# Patient Record
Sex: FEMALE | Race: WHITE
[De-identification: names, ages, dates, MRNs, and addresses within clinical notes are randomized per-mention and may not be internally consistent; named-entity substitution may affect disease eponyms.]

---

## 2017-12-18 NOTE — ED
GI Bleed HPI





- General


Chief complaint: GI Bleed


Stated complaint: rectal bleeding/pain


Time Seen by Provider: 12/18/17 14:31


Source: patient, RN notes reviewed


Mode of arrival: ambulatory


Limitations: no limitations





- History of Present Illness


Initial comments: 





This is a 27-year-old female presents emergency Department chief complaint of 

rectal pain.  Patient states that she's had increasing rectal pain last few 

days.  She states this started after having multiple bowel movements of 

diarrhea.  Patient is concerned that she's had prior rectal abscess with for 

prior surgeries.  She states it's was several years ago.  Patient denies any 

nausea or vomiting.  She states that she has a burning type discomfort and 

noticed some blood and mucus.  Patient denies any upper abdominal discomfort 

denies any dysuria hematuria.  Denies any chance pregnancy.





- Related Data


 Home Medications











 Medication  Instructions  Recorded  Confirmed


 


Aubra 1 tab PO DAILY@1900 12/18/17 12/18/17


 


Citalopram Hydrobromide [CeleXA] 10 mg PO DAILY@1900 12/18/17 12/18/17








 Previous Rx's











 Medication  Instructions  Recorded


 


Hydrocortisone/Pramoxine 1 applic RECTAL BID #1 bottle 12/18/17





[Proctofoam-Hc 1%-1% Foam]  











 Allergies











Allergy/AdvReac Type Severity Reaction Status Date / Time


 


No Known Allergies Allergy   Verified 12/18/17 14:32














Review of Systems


ROS Statement: 


Those systems with pertinent positive or pertinent negative responses have been 

documented in the HPI.





ROS Other: All systems not noted in ROS Statement are negative.





Past Medical History


Past Medical History: No Reported History


History of Any Multi-Drug Resistant Organisms: None Reported


Past Surgical History: No Surgical Hx Reported


Additional Past Surgical History / Comment(s): I&D of cyst


Past Anesthesia/Blood Transfusion Reactions: No Reported Reaction


Past Psychological History: No Psychological Hx Reported


Smoking Status: Former smoker


Past Alcohol Use History: None Reported


Past Drug Use History: Marijuana





General Exam


Limitations: no limitations


General appearance: alert, in no apparent distress


Head exam: Present: atraumatic, normocephalic, normal inspection


Neck exam: Present: normal inspection.  Absent: tenderness, meningismus, 

lymphadenopathy


Respiratory exam: Present: normal lung sounds bilaterally.  Absent: respiratory 

distress, wheezes, rales, rhonchi, stridor


Cardiovascular Exam: Present: regular rate, normal rhythm, normal heart sounds.

  Absent: systolic murmur, diastolic murmur, rubs, gallop, clicks


GI/Abdominal exam: Present: soft, normal bowel sounds.  Absent: distended, 

tenderness, guarding, rebound, rigid


Rectal exam: Absent: normal inspection (Erythema and rash noted in the perianal 

region, no obvious hemorrhoids no obvious blood or open lacerations.  Patient 

declined rectal)


Back exam: Absent: CVA tenderness (R), CVA tenderness (L)


Skin exam: Present: warm, dry, intact, normal color.  Absent: rash





Course


 Vital Signs











  12/18/17 12/18/17





  13:53 15:08


 


Temperature 98.8 F 98.2 F


 


Pulse Rate 110 H 79


 


Respiratory 18 16





Rate  


 


Blood Pressure 156/72 115/70


 


O2 Sat by Pulse 96 96





Oximetry  














Medical Decision Making





- Medical Decision Making





27-year-old female presents emergency Department chief complaint rectal pain, 

rectal bleeding.  Patient had a history of rectal abscesses no evidence of 

infection fracture process on CT.  Patient be given hydrocortisone cream, 

advised take stool softener as she has stool burden, continue to use Tucks pads 

and continues over-the-counter rectal care lidocaine jelly





- Lab Data


Result diagrams: 


 12/18/17 14:55





 12/18/17 14:55


 Lab Results











  12/18/17 12/18/17 12/18/17 Range/Units





  14:55 14:55 14:55 


 


WBC  10.2    (3.8-10.6)  k/uL


 


RBC  5.04    (3.80-5.40)  m/uL


 


Hgb  14.7    (11.4-16.0)  gm/dL


 


Hct  44.2    (34.0-46.0)  %


 


MCV  87.8    (80.0-100.0)  fL


 


MCH  29.1    (25.0-35.0)  pg


 


MCHC  33.2    (31.0-37.0)  g/dL


 


RDW  14.1    (11.5-15.5)  %


 


Plt Count  240    (150-450)  k/uL


 


Neutrophils %  77    %


 


Lymphocytes %  14    %


 


Monocytes %  7    %


 


Eosinophils %  2    %


 


Basophils %  0    %


 


Neutrophils #  7.8 H    (1.3-7.7)  k/uL


 


Lymphocytes #  1.4    (1.0-4.8)  k/uL


 


Monocytes #  0.7    (0-1.0)  k/uL


 


Eosinophils #  0.2    (0-0.7)  k/uL


 


Basophils #  0.0    (0-0.2)  k/uL


 


PT    10.1  (9.0-12.0)  sec


 


INR    1.0  (<1.2)  


 


APTT    25.7  (22.0-30.0)  sec


 


Sodium   140   (137-145)  mmol/L


 


Potassium   4.1   (3.5-5.1)  mmol/L


 


Chloride   104   ()  mmol/L


 


Carbon Dioxide   25   (22-30)  mmol/L


 


Anion Gap   11   mmol/L


 


BUN   13   (7-17)  mg/dL


 


Creatinine   0.71   (0.52-1.04)  mg/dL


 


Est GFR (MDRD) Af Amer   >60   (>60 ml/min/1.73 sqM)  


 


Est GFR (MDRD) Non-Af   >60   (>60 ml/min/1.73 sqM)  


 


Glucose   101 H   (74-99)  mg/dL


 


Calcium   9.6   (8.4-10.2)  mg/dL


 


Total Bilirubin   0.5   (0.2-1.3)  mg/dL


 


AST   24   (14-36)  U/L


 


ALT   37   (9-52)  U/L


 


Alkaline Phosphatase   67   ()  U/L


 


Total Protein   7.9   (6.3-8.2)  g/dL


 


Albumin   4.2   (3.5-5.0)  g/dL


 


Urine Color     


 


Urine Appearance     (Clear)  


 


Urine pH     (5.0-8.0)  


 


Ur Specific Gravity     (1.001-1.035)  


 


Urine Protein     (Negative)  


 


Urine Glucose (UA)     (Negative)  


 


Urine Ketones     (Negative)  


 


Urine Blood     (Negative)  


 


Urine Nitrite     (Negative)  


 


Urine Bilirubin     (Negative)  


 


Urine Urobilinogen     (<2.0)  mg/dL


 


Ur Leukocyte Esterase     (Negative)  


 


Urine RBC     (0-5)  /hpf


 


Urine WBC     (0-5)  /hpf


 


Ur Squamous Epith Cells     (0-4)  /hpf


 


Urine HCG, Qual     (Not Detectd)  














  12/18/17 12/18/17 Range/Units





  14:55 14:55 


 


WBC    (3.8-10.6)  k/uL


 


RBC    (3.80-5.40)  m/uL


 


Hgb    (11.4-16.0)  gm/dL


 


Hct    (34.0-46.0)  %


 


MCV    (80.0-100.0)  fL


 


MCH    (25.0-35.0)  pg


 


MCHC    (31.0-37.0)  g/dL


 


RDW    (11.5-15.5)  %


 


Plt Count    (150-450)  k/uL


 


Neutrophils %    %


 


Lymphocytes %    %


 


Monocytes %    %


 


Eosinophils %    %


 


Basophils %    %


 


Neutrophils #    (1.3-7.7)  k/uL


 


Lymphocytes #    (1.0-4.8)  k/uL


 


Monocytes #    (0-1.0)  k/uL


 


Eosinophils #    (0-0.7)  k/uL


 


Basophils #    (0-0.2)  k/uL


 


PT    (9.0-12.0)  sec


 


INR    (<1.2)  


 


APTT    (22.0-30.0)  sec


 


Sodium    (137-145)  mmol/L


 


Potassium    (3.5-5.1)  mmol/L


 


Chloride    ()  mmol/L


 


Carbon Dioxide    (22-30)  mmol/L


 


Anion Gap    mmol/L


 


BUN    (7-17)  mg/dL


 


Creatinine    (0.52-1.04)  mg/dL


 


Est GFR (MDRD) Af Amer    (>60 ml/min/1.73 sqM)  


 


Est GFR (MDRD) Non-Af    (>60 ml/min/1.73 sqM)  


 


Glucose    (74-99)  mg/dL


 


Calcium    (8.4-10.2)  mg/dL


 


Total Bilirubin    (0.2-1.3)  mg/dL


 


AST    (14-36)  U/L


 


ALT    (9-52)  U/L


 


Alkaline Phosphatase    ()  U/L


 


Total Protein    (6.3-8.2)  g/dL


 


Albumin    (3.5-5.0)  g/dL


 


Urine Color   Yellow  


 


Urine Appearance   Clear  (Clear)  


 


Urine pH   6.5  (5.0-8.0)  


 


Ur Specific Gravity   1.013  (1.001-1.035)  


 


Urine Protein   Negative  (Negative)  


 


Urine Glucose (UA)   Negative  (Negative)  


 


Urine Ketones   Negative  (Negative)  


 


Urine Blood   Trace H  (Negative)  


 


Urine Nitrite   Negative  (Negative)  


 


Urine Bilirubin   Negative  (Negative)  


 


Urine Urobilinogen   <2.0  (<2.0)  mg/dL


 


Ur Leukocyte Esterase   Large H  (Negative)  


 


Urine RBC   1  (0-5)  /hpf


 


Urine WBC   7 H  (0-5)  /hpf


 


Ur Squamous Epith Cells   3  (0-4)  /hpf


 


Urine HCG, Qual  Not Detected   (Not Detectd)  














Disposition


Clinical Impression: 


 Rectal pain, Hemorrhoids





Disposition: HOME SELF-CARE


Condition: Stable


Instructions:  Rectal Pain (ED)


Additional Instructions: 


Please return to the Emergency Department if symptoms worsen or any other 

concerns.


Prescriptions: 


Hydrocortisone/Pramoxine [Proctofoam-Hc 1%-1% Foam] 1 applic RECTAL BID #1 

bottle


Referrals: 


Vi Rahman MD [Primary Care Provider] - 1-2 days


Time of Disposition: 16:15

## 2017-12-18 NOTE — CT
EXAMINATION TYPE: CT abdomen pelvis w con

 

DATE OF EXAM: 12/18/2017

 

COMPARISON: 11/5/2010

 

HISTORY: 27-year-old female with rectal bleeding and pain

 

TECHNIQUE: Contiguous axial scanning of the abdomen and pelvis following administration of 100 ml Omn
ipaque 300 IV contrast.  Delayed images through the kidneys and coronal/sagittal reconstructions perf
ormed.

 

CT DLP: 1854 mGycm

Automated exposure control for dose reduction was used.

 

 

FINDINGS:

Heart is normal size without pericardial effusion. Lung bases clear without pleural effusion.

 

Liver enlarged measuring 18.8 cm craniocaudal. No focal lesion is seen. No biliary ductal dilatation.
 Portal venous system is patent

 

Gallbladder, adrenal glands, kidneys, spleen with anterior splenule, and pancreas appear within amado
l limits.

 

Scattered nonenlarged mesenteric lymph nodes are present.

 

No dilated small bowel, free fluid, or free air.

 

Normal appendix visualized.

 

Moderate stool burden. No pericolonic inflammatory change. Mildly redundant sigmoid colon.

 

Bladder is urine distended. Uterus and ovaries are visualized with follicular change. No abnormal flu
id collection in the pelvis.

 

Prominent but not enlarged right inguinal lymph node measures up to 1.3 cm, increased in size from 11
/5/2010.

 

The distal rectum and anus are relatively collapsed and show no gross abnormality.

 

Bones: No osseous destructive process.

 

 

IMPRESSION: 

 

1. THE DISTAL RECTUM AND ANUS ARE COLLAPSED AND SHOW NO GROSS ABNORMALITY.

2. MODERATE STOOL BURDEN. NO ACUTE INFLAMMATORY PROCESS IDENTIFIED IN THE ABDOMEN OR PELVIS.

3. BORDERLINE SIZE RIGHT INGUINAL LYMPH NODE MEASURING 1.3 CM PROBABLY REACTIVE/POST INFLAMMATORY. CL
INICALLY CORRELATE.

4. HEPATOMEGALY AT 18.8 CM.

## 2018-08-21 NOTE — ED
Weakness HPI





- General


Chief complaint: Weakness


Stated complaint: weakness


Time Seen by Provider: 18 13:31


Source: patient, RN notes reviewed


Mode of arrival: ambulatory


Limitations: no limitations





- History of Present Illness


Initial comments: 





28-year-old female presents emergency Department chief complaint of fatigue, 

denies fever or chills.  Patient states that she felt that she had a fever, she 

felt hot flush but had no recorded temperature at home.  Patient states that she

's had no cold like symptoms including sore throat, headache, dizziness, ear 

pain, cough or chest congestion.  She states that she's been sleeping more than 

usual even when she gets up and sleeping she still tired.  She is concerned 

about possible thyroid disease.  Patient has no specific abdominal pain no 

dysuria no hematuria.  She states that she is due for her menstrual cycle 

denies any chance pregnancy.  Patient states that she does take birth control.  

Patient denies any back pain, chest pain





- Related Data


 Home Medications











 Medication  Instructions  Recorded  Confirmed


 


Citalopram Hydrobromide [CeleXA] 10 mg PO DAILY@1900 17


 


Aviane 0.10/0.02 Birth Control 1 tab PO DAILY@1900 18


 


Ibuprofen [Motrin Ib] 400 mg PO Q6H PRN 18











 Allergies











Allergy/AdvReac Type Severity Reaction Status Date / Time


 


No Known Allergies Allergy   Verified 18 13:52














Review of Systems


ROS Statement: 


Those systems with pertinent positive or pertinent negative responses have been 

documented in the HPI.





ROS Other: All systems not noted in ROS Statement are negative.





Past Medical History


Past Medical History: No Reported History


History of Any Multi-Drug Resistant Organisms: None Reported


Past Surgical History:  Section


Additional Past Surgical History / Comment(s): I&D of cyst


Past Anesthesia/Blood Transfusion Reactions: No Reported Reaction


Past Psychological History: Anxiety


Smoking Status: Current every day smoker


Past Alcohol Use History: Occasional


Past Drug Use History: Marijuana





General Exam


Limitations: no limitations


General appearance: alert, in no apparent distress


Head exam: Present: atraumatic, normocephalic, normal inspection


Eye exam: Present: normal appearance, PERRL, EOMI.  Absent: scleral icterus, 

conjunctival injection, periorbital swelling


ENT exam: Present: normal exam, normal oropharynx, mucous membranes moist, TM's 

normal bilaterally, normal external ear exam


Neck exam: Present: normal inspection, full ROM.  Absent: tenderness, 

meningismus, lymphadenopathy


Respiratory exam: Present: normal lung sounds bilaterally.  Absent: respiratory 

distress, wheezes, rales, rhonchi, stridor


Cardiovascular Exam: Present: regular rate, normal rhythm, normal heart sounds.

  Absent: systolic murmur, diastolic murmur, rubs, gallop, clicks


GI/Abdominal exam: Present: soft, normal bowel sounds.  Absent: distended, 

tenderness, guarding, rebound, rigid


Neurological exam: Present: alert, oriented X3, CN II-XII intact


Skin exam: Present: warm, dry, intact, normal color.  Absent: rash





Course


 Vital Signs











  18





  12:39


 


Temperature 98.2 F


 


Pulse Rate 93


 


Respiratory 18





Rate 


 


Blood Pressure 113/77


 


O2 Sat by Pulse 100





Oximetry 














Medical Decision Making





- Medical Decision Making





28 show female presented for generalized fatigue weakness.  Patient has normal 

lab workup.  Patient may have underlying viral illness or syndrome.  Patient 

will be advised to follow-up with PCP and return for any worsening symptoms.





- Lab Data


Result diagrams: 


 18 13:49





 18 13:49


 Lab Results











  18 Range/Units





  12:43 12:43 13:49 


 


WBC    7.5  (3.8-10.6)  k/uL


 


RBC    5.02  (3.80-5.40)  m/uL


 


Hgb    14.4  (11.4-16.0)  gm/dL


 


Hct    44.6  (34.0-46.0)  %


 


MCV    89.0  (80.0-100.0)  fL


 


MCH    28.7  (25.0-35.0)  pg


 


MCHC    32.2  (31.0-37.0)  g/dL


 


RDW    13.3  (11.5-15.5)  %


 


Plt Count    278  (150-450)  k/uL


 


Neutrophils %    63  %


 


Lymphocytes %    26  %


 


Monocytes %    6  %


 


Eosinophils %    3  %


 


Basophils %    0  %


 


Neutrophils #    4.7  (1.3-7.7)  k/uL


 


Lymphocytes #    2.0  (1.0-4.8)  k/uL


 


Monocytes #    0.5  (0-1.0)  k/uL


 


Eosinophils #    0.2  (0-0.7)  k/uL


 


Basophils #    0.0  (0-0.2)  k/uL


 


Sodium     (137-145)  mmol/L


 


Potassium     (3.5-5.1)  mmol/L


 


Chloride     ()  mmol/L


 


Carbon Dioxide     (22-30)  mmol/L


 


Anion Gap     mmol/L


 


BUN     (7-17)  mg/dL


 


Creatinine     (0.52-1.04)  mg/dL


 


Est GFR (CKD-EPI)AfAm     (>60 ml/min/1.73 sqM)  


 


Est GFR (CKD-EPI)NonAf     (>60 ml/min/1.73 sqM)  


 


Glucose     (74-99)  mg/dL


 


Plasma Lactic Acid Abdifatah     (0.7-2.0)  mmol/L


 


Calcium     (8.4-10.2)  mg/dL


 


Magnesium     (1.6-2.3)  mg/dL


 


Total Bilirubin     (0.2-1.3)  mg/dL


 


AST     (14-36)  U/L


 


ALT     (9-52)  U/L


 


Alkaline Phosphatase     ()  U/L


 


Total Protein     (6.3-8.2)  g/dL


 


Albumin     (3.5-5.0)  g/dL


 


TSH     (0.465-4.680)  mIU/L


 


Urine Color   Yellow   


 


Urine Appearance   Clear   (Clear)  


 


Urine pH   6.5   (5.0-8.0)  


 


Ur Specific Gravity   1.013   (1.001-1.035)  


 


Urine Protein   Negative   (Negative)  


 


Urine Glucose (UA)   Negative   (Negative)  


 


Urine Ketones   Negative   (Negative)  


 


Urine Blood   Negative   (Negative)  


 


Urine Nitrite   Negative   (Negative)  


 


Urine Bilirubin   Negative   (Negative)  


 


Urine Urobilinogen   <2.0   (<2.0)  mg/dL


 


Ur Leukocyte Esterase   Trace H   (Negative)  


 


Urine RBC   1   (0-5)  /hpf


 


Urine WBC   1   (0-5)  /hpf


 


Ur Squamous Epith Cells   2   (0-4)  /hpf


 


Urine Bacteria   Rare H   (None)  /hpf


 


Urine HCG, Qual  Not Detected    (Not Detectd)  


 


Heterophile Antibody     (Negative)  














  18 Range/Units





  13:49 13:49 14:00 


 


WBC     (3.8-10.6)  k/uL


 


RBC     (3.80-5.40)  m/uL


 


Hgb     (11.4-16.0)  gm/dL


 


Hct     (34.0-46.0)  %


 


MCV     (80.0-100.0)  fL


 


MCH     (25.0-35.0)  pg


 


MCHC     (31.0-37.0)  g/dL


 


RDW     (11.5-15.5)  %


 


Plt Count     (150-450)  k/uL


 


Neutrophils %     %


 


Lymphocytes %     %


 


Monocytes %     %


 


Eosinophils %     %


 


Basophils %     %


 


Neutrophils #     (1.3-7.7)  k/uL


 


Lymphocytes #     (1.0-4.8)  k/uL


 


Monocytes #     (0-1.0)  k/uL


 


Eosinophils #     (0-0.7)  k/uL


 


Basophils #     (0-0.2)  k/uL


 


Sodium  140    (137-145)  mmol/L


 


Potassium  4.5    (3.5-5.1)  mmol/L


 


Chloride  109 H    ()  mmol/L


 


Carbon Dioxide  23    (22-30)  mmol/L


 


Anion Gap  8    mmol/L


 


BUN  12    (7-17)  mg/dL


 


Creatinine  0.69    (0.52-1.04)  mg/dL


 


Est GFR (CKD-EPI)AfAm  >90    (>60 ml/min/1.73 sqM)  


 


Est GFR (CKD-EPI)NonAf  >90    (>60 ml/min/1.73 sqM)  


 


Glucose  106 H    (74-99)  mg/dL


 


Plasma Lactic Acid Abdifatah    1.0  (0.7-2.0)  mmol/L


 


Calcium  9.1    (8.4-10.2)  mg/dL


 


Magnesium  1.9    (1.6-2.3)  mg/dL


 


Total Bilirubin  0.5    (0.2-1.3)  mg/dL


 


AST  30    (14-36)  U/L


 


ALT  35    (9-52)  U/L


 


Alkaline Phosphatase  49    ()  U/L


 


Total Protein  7.4    (6.3-8.2)  g/dL


 


Albumin  4.0    (3.5-5.0)  g/dL


 


TSH  0.472    (0.465-4.680)  mIU/L


 


Urine Color     


 


Urine Appearance     (Clear)  


 


Urine pH     (5.0-8.0)  


 


Ur Specific Gravity     (1.001-1.035)  


 


Urine Protein     (Negative)  


 


Urine Glucose (UA)     (Negative)  


 


Urine Ketones     (Negative)  


 


Urine Blood     (Negative)  


 


Urine Nitrite     (Negative)  


 


Urine Bilirubin     (Negative)  


 


Urine Urobilinogen     (<2.0)  mg/dL


 


Ur Leukocyte Esterase     (Negative)  


 


Urine RBC     (0-5)  /hpf


 


Urine WBC     (0-5)  /hpf


 


Ur Squamous Epith Cells     (0-4)  /hpf


 


Urine Bacteria     (None)  /hpf


 


Urine HCG, Qual     (Not Detectd)  


 


Heterophile Antibody   Negative   (Negative)  














Disposition


Clinical Impression: 


 Fatigue, Viral syndrome





Disposition: HOME SELF-CARE


Condition: Stable


Instructions:  Fatigue (ED), Weakness (ED)


Additional Instructions: 


Please return to the Emergency Department if symptoms worsen or any other 

concerns.


Is patient prescribed a controlled substance at d/c from ED?: No


Referrals: 


Vi Rahman MD [Primary Care Provider] - 1-2 days


Time of Disposition: 14:58

## 2018-11-18 NOTE — XR
EXAMINATION TYPE: XR chest 2V

 

DATE OF EXAM: 11/18/2018

 

CLINICAL HISTORY: Pain upper respiratory infection 

 

TECHNIQUE:  Frontal and lateral views of the chest are obtained.

 

COMPARISON: None

 

FINDINGS: The heart size is normal. The pulmonary vasculature is normal. The lungs are clear.

Necklace overlies left apex.

 

IMPRESSION:

1. No acute pulmonary process.

## 2018-11-18 NOTE — ED
General Adult HPI





- General


Chief complaint: Upper Respiratory Infection


Stated complaint: Abd pain


Time Seen by Provider: 18 16:16


Source: patient


Mode of arrival: ambulatory


Limitations: no limitations





- History of Present Illness


Initial comments: 


Residual female presenting with 1 week of harsh cough, subjective fevers, 

headache and generalized fatigue.  She states that today she began to develop 

sharp stabbing right lower quadrant pain that is constant, not alleviated or 

exacerbated by anything, not accompanied by any other symptoms.  She denies any 

nausea vomiting or diarrhea.  Cough is productive and is worsening.  She's been 

trying DayQuil without relief. 








- Related Data


 Home Medications











 Medication  Instructions  Recorded  Confirmed


 


Citalopram Hydrobromide [CeleXA] 10 mg PO DAILY@17


 


Aviane 0.10/0.02 Birth Control 1 tab PO DAILY@18


 


Ibuprofen [Motrin Ib] 400 mg PO Q6H PRN 18








 Previous Rx's











 Medication  Instructions  Recorded


 


Albuterol Inhaler [Ventolin Hfa 1 - 2 puff INHALATION RT-Q6H PRN 18





Inhaler] #1 inhaler 


 


Dicyclomine [Bentyl] 20 mg PO QID #20 tablet 18


 


predniSONE 40 mg PO DAILY 5 Days #10 tab 18











 Allergies











Allergy/AdvReac Type Severity Reaction Status Date / Time


 


No Known Allergies Allergy   Verified 18 13:52














Review of Systems


ROS Statement: 


Those systems with pertinent positive or pertinent negative responses have been 

documented in the HPI.


Review of Systems


Constitutional: Denies fever, chills 


Eyes: Denies change in vision, Denies pain


Ears, nose, mouth, throat: Denies headaches, Denies sore throat


Cardiovascular: Denies chest pain. Denies palpitations 


Respiratory: Denies shortness of breath, Positive cough


Gastrointestinal: Positive abdominal pain. Denies nausea, vomiting, diarrhea. 


Genitourinary: Denies hematuria, Denies infections


Musculoskeletal: Denies pain, Denies swelling


Integumentary: Denies rash


Neurological: Positive headache, focal weakness, focal numbness


Psychiatric: Denies anxiety, Denies depression


Hematologic/Lymphatic: Denies easy bleeding or bruising 


ROS Other: All systems not noted in ROS Statement are negative.





Past Medical History


Past Medical History: No Reported History


History of Any Multi-Drug Resistant Organisms: None Reported


Past Surgical History:  Section


Additional Past Surgical History / Comment(s): I&D of cyst


Past Anesthesia/Blood Transfusion Reactions: No Reported Reaction


Past Psychological History: Anxiety


Smoking Status: Current every day smoker


Past Alcohol Use History: Occasional


Past Drug Use History: Marijuana





General Exam





- General Exam Comments


Initial Comments: 


General: Awake, alert, No acute Distress


HENT: Normocephalic. Atraumatic.  No erythematous TM bilaterally.  Post-

oropharyngeal erythema


Eyes: PERRL. EOMI. No scleral icterus. No injected conjunctiva


Neck: Full ROM


Chest/Lungs: Bilateral expiratory wheeze and right lower lung field rhonchi.


Cardiac: Regular rate, rhythm. No murmurs or rubs


Abdomen/GI: Soft, right lower quadrant tenderness.  Positive McBurney's point.  

nondistended. No rebound, guarding, or rigidity.


Musculoskeletal: Full ROM 


Skin: Warm, dry, intact


Neurologic: A/Ox3, no weakness, no sensory deficit, no abnormal gait, no 

coordination deficit





Limitations: no limitations





Course


 Vital Signs











  18





  16:05 17:03 17:10


 


Temperature 98.5 F  


 


Pulse Rate 64 66 70


 


Respiratory 18  





Rate   


 


Blood Pressure 121/74  


 


O2 Sat by Pulse 96  





Oximetry   














  18





  18:07 18:22 19:00


 


Temperature   


 


Pulse Rate 68  89


 


Respiratory 18  18





Rate   


 


Blood Pressure 123/73  112/68


 


O2 Sat by Pulse 98 93 L 97





Oximetry   














Medical Decision Making





- Medical Decision Making


28-year-old female presenting with URI symptoms and abdominal pain.  Initial 

exam the patient is awake, alert, no acute distress.  VSS.  Patient's 

laboratory workup revealed hyperkalemia however the result was immobilized and 

the patient had no kidney dysfunction.  Did not to treat the hyperkalemia.  The 

remainder the patient's laboratory workup was negative for acute process.  

Chest x-ray and CT abdomen and pelvis were also negative for acute process.  She

's a gallstone was seen however the patient has no right upper quadrant 

tenderness on exam.  The patient denied any vaginal bleeding or discharge.  

Patient initially was complaining of headache and neck pain this is been 

ongoing for a week.  I offered the patient LP but she declined.  Very low 

suspicion for bacterial meningitis at this time.  The patient was given 

prescriptions for prednisone and albuterol inhaler for bronchitis as well as 

Bentyl for abdominal cramping. The patient was instructed to RTER if her 

abdominal pain worsens, especially over the next 8-12 hours. No further 

emergent workup indicated. The patient was given return to ED instructions. 

They were instructed to follow up with their primary care provider. Stable for 

discharge at this time. 








- Lab Data


Result diagrams: 


 18 17:03





 18 17:03


 Lab Results











  18 Range/Units





  17:03 17:03 17:03 


 


WBC   8.4   (3.8-10.6)  k/uL


 


RBC   4.76   (3.80-5.40)  m/uL


 


Hgb   14.4   (11.4-16.0)  gm/dL


 


Hct   42.4   (34.0-46.0)  %


 


MCV   89.0   (80.0-100.0)  fL


 


MCH   30.3   (25.0-35.0)  pg


 


MCHC   34.1   (31.0-37.0)  g/dL


 


RDW   13.3   (11.5-15.5)  %


 


Plt Count   216   (150-450)  k/uL


 


Neutrophils %   63   %


 


Lymphocytes %   26   %


 


Monocytes %   6   %


 


Eosinophils %   4   %


 


Basophils %   0   %


 


Neutrophils #   5.3   (1.3-7.7)  k/uL


 


Lymphocytes #   2.2   (1.0-4.8)  k/uL


 


Monocytes #   0.5   (0-1.0)  k/uL


 


Eosinophils #   0.3   (0-0.7)  k/uL


 


Basophils #   0.0   (0-0.2)  k/uL


 


Sodium  141    (137-145)  mmol/L


 


Potassium  5.7 H    (3.5-5.1)  mmol/L


 


Chloride  110 H    ()  mmol/L


 


Carbon Dioxide  20 L    (22-30)  mmol/L


 


Anion Gap  11    mmol/L


 


BUN  10    (7-17)  mg/dL


 


Creatinine  0.75    (0.52-1.04)  mg/dL


 


Est GFR (CKD-EPI)AfAm  >90    (>60 ml/min/1.73 sqM)  


 


Est GFR (CKD-EPI)NonAf  >90    (>60 ml/min/1.73 sqM)  


 


Glucose  82    (74-99)  mg/dL


 


Calcium  9.7    (8.4-10.2)  mg/dL


 


Urine Color     


 


Urine Appearance     (Clear)  


 


Urine pH     (5.0-8.0)  


 


Ur Specific Gravity     (1.001-1.035)  


 


Urine Protein     (Negative)  


 


Urine Glucose (UA)     (Negative)  


 


Urine Ketones     (Negative)  


 


Urine Blood     (Negative)  


 


Urine Nitrite     (Negative)  


 


Urine Bilirubin     (Negative)  


 


Urine Urobilinogen     (<2.0)  mg/dL


 


Ur Leukocyte Esterase     (Negative)  


 


Urine WBC     (0-5)  /hpf


 


Ur Squamous Epith Cells     (0-4)  /hpf


 


Urine Bacteria     (None)  /hpf


 


Urine HCG, Qual    Not Detected  (Not Detectd)  


 


Heterophile Antibody     (Negative)  


 


Influenza Type A RNA     (Not Detectd)  


 


Influenza Type B (PCR)     (Not Detectd)  


 


Group A Strep Rapid     (Negative)  














  18 Range/Units





  17:03 17:03 17:03 


 


WBC     (3.8-10.6)  k/uL


 


RBC     (3.80-5.40)  m/uL


 


Hgb     (11.4-16.0)  gm/dL


 


Hct     (34.0-46.0)  %


 


MCV     (80.0-100.0)  fL


 


MCH     (25.0-35.0)  pg


 


MCHC     (31.0-37.0)  g/dL


 


RDW     (11.5-15.5)  %


 


Plt Count     (150-450)  k/uL


 


Neutrophils %     %


 


Lymphocytes %     %


 


Monocytes %     %


 


Eosinophils %     %


 


Basophils %     %


 


Neutrophils #     (1.3-7.7)  k/uL


 


Lymphocytes #     (1.0-4.8)  k/uL


 


Monocytes #     (0-1.0)  k/uL


 


Eosinophils #     (0-0.7)  k/uL


 


Basophils #     (0-0.2)  k/uL


 


Sodium     (137-145)  mmol/L


 


Potassium     (3.5-5.1)  mmol/L


 


Chloride     ()  mmol/L


 


Carbon Dioxide     (22-30)  mmol/L


 


Anion Gap     mmol/L


 


BUN     (7-17)  mg/dL


 


Creatinine     (0.52-1.04)  mg/dL


 


Est GFR (CKD-EPI)AfAm     (>60 ml/min/1.73 sqM)  


 


Est GFR (CKD-EPI)NonAf     (>60 ml/min/1.73 sqM)  


 


Glucose     (74-99)  mg/dL


 


Calcium     (8.4-10.2)  mg/dL


 


Urine Color    Light Yellow  


 


Urine Appearance    Cloudy H  (Clear)  


 


Urine pH    8.0  (5.0-8.0)  


 


Ur Specific Gravity    1.011  (1.001-1.035)  


 


Urine Protein    Negative  (Negative)  


 


Urine Glucose (UA)    Negative  (Negative)  


 


Urine Ketones    Negative  (Negative)  


 


Urine Blood    Negative  (Negative)  


 


Urine Nitrite    Negative  (Negative)  


 


Urine Bilirubin    Negative  (Negative)  


 


Urine Urobilinogen    <2.0  (<2.0)  mg/dL


 


Ur Leukocyte Esterase    Small H  (Negative)  


 


Urine WBC    6 H  (0-5)  /hpf


 


Ur Squamous Epith Cells    8 H  (0-4)  /hpf


 


Urine Bacteria    Rare H  (None)  /hpf


 


Urine HCG, Qual     (Not Detectd)  


 


Heterophile Antibody     (Negative)  


 


Influenza Type A RNA  Not Detected    (Not Detectd)  


 


Influenza Type B (PCR)  Not Detected    (Not Detectd)  


 


Group A Strep Rapid   Negative   (Negative)  














  18 Range/Units





  17:03 


 


WBC   (3.8-10.6)  k/uL


 


RBC   (3.80-5.40)  m/uL


 


Hgb   (11.4-16.0)  gm/dL


 


Hct   (34.0-46.0)  %


 


MCV   (80.0-100.0)  fL


 


MCH   (25.0-35.0)  pg


 


MCHC   (31.0-37.0)  g/dL


 


RDW   (11.5-15.5)  %


 


Plt Count   (150-450)  k/uL


 


Neutrophils %   %


 


Lymphocytes %   %


 


Monocytes %   %


 


Eosinophils %   %


 


Basophils %   %


 


Neutrophils #   (1.3-7.7)  k/uL


 


Lymphocytes #   (1.0-4.8)  k/uL


 


Monocytes #   (0-1.0)  k/uL


 


Eosinophils #   (0-0.7)  k/uL


 


Basophils #   (0-0.2)  k/uL


 


Sodium   (137-145)  mmol/L


 


Potassium   (3.5-5.1)  mmol/L


 


Chloride   ()  mmol/L


 


Carbon Dioxide   (22-30)  mmol/L


 


Anion Gap   mmol/L


 


BUN   (7-17)  mg/dL


 


Creatinine   (0.52-1.04)  mg/dL


 


Est GFR (CKD-EPI)AfAm   (>60 ml/min/1.73 sqM)  


 


Est GFR (CKD-EPI)NonAf   (>60 ml/min/1.73 sqM)  


 


Glucose   (74-99)  mg/dL


 


Calcium   (8.4-10.2)  mg/dL


 


Urine Color   


 


Urine Appearance   (Clear)  


 


Urine pH   (5.0-8.0)  


 


Ur Specific Gravity   (1.001-1.035)  


 


Urine Protein   (Negative)  


 


Urine Glucose (UA)   (Negative)  


 


Urine Ketones   (Negative)  


 


Urine Blood   (Negative)  


 


Urine Nitrite   (Negative)  


 


Urine Bilirubin   (Negative)  


 


Urine Urobilinogen   (<2.0)  mg/dL


 


Ur Leukocyte Esterase   (Negative)  


 


Urine WBC   (0-5)  /hpf


 


Ur Squamous Epith Cells   (0-4)  /hpf


 


Urine Bacteria   (None)  /hpf


 


Urine HCG, Qual   (Not Detectd)  


 


Heterophile Antibody  Negative  (Negative)  


 


Influenza Type A RNA   (Not Detectd)  


 


Influenza Type B (PCR)   (Not Detectd)  


 


Group A Strep Rapid   (Negative)  














Disposition


Clinical Impression: 


 Abdominal pain, Bronchitis, URI (upper respiratory infection)





Disposition: HOME SELF-CARE


Condition: Good


Instructions:  Upper Respiratory Infection (ED), Abdominal Pain (ED)


Prescriptions: 


Albuterol Inhaler [Ventolin Hfa Inhaler] 1 - 2 puff INHALATION RT-Q6H PRN #1 

inhaler


 PRN Reason: Wheezing


Dicyclomine [Bentyl] 20 mg PO QID #20 tablet


predniSONE 40 mg PO DAILY 5 Days #10 tab


Is patient prescribed a controlled substance at d/c from ED?: No


Referrals: 


Vi Rahman MD [Primary Care Provider] - 1-2 days

## 2018-11-18 NOTE — CT
EXAMINATION TYPE: CT abdomen pelvis w con

 

DATE OF EXAM: 11/18/2018

 

COMPARISON: 12/18/2017

 

INDICATION: Right sided pain with nausea and diarrhea

 

DLP: 1299.6 mGycm, Automated exposure control for dose reduction was used.

 

CONTRAST:  100 mL of Isovue 300. 

                        Study performed without Oral Contrast

 

TECHNIQUE: Axial images were obtained from above the diaphragm to the pubic rami in the axial plane a
t 5 mm thick sections.  Reconstructed images are reviewed on the computer in the coronal plane.  

 

FINDINGS:

 

Limited CT sections are obtained the lung bases.  The lung bases are clear.

 

CT ABDOMEN:

 

Liver: Hepatomegaly measuring 20.9 cm. Normal less than 15.5 cm. This is increased from comparison.

 

Spleen: Normal. Splenule is present.

 

Pancreas: Normal

 

Adrenal glands: The adrenal glands are normal.

 

Gallbladder: Punctate gallstone is noted.  

 

Kidneys: No masses are evident. No hydronephrosis is present.   No cysts are present.  Delayed images
 were obtained through the kidneys, which remain unremarkable.

 

Aorta: Normal

 

Inferior vena cava: Normal.

 

CT PELVIS: 

Loops of bowel within the abdomen and pelvis are normal.     Studies without oral contrast limiting t
he evaluation.

 

Appendix: Normal as visualized.

 

Urinary bladder: Normal. 

 

Genitourinary structures: Uterus and ovaries are unremarkable.

 

Osseous structures: No suspicious lytic or sclerotic lesions.

 

IMPRESSIONS:

1.  Small gallstone.

2. Hepatomegaly.

## 2020-02-29 ENCOUNTER — HOSPITAL ENCOUNTER (EMERGENCY)
Dept: HOSPITAL 47 - EC | Age: 30
Discharge: HOME | End: 2020-02-29
Payer: COMMERCIAL

## 2020-02-29 VITALS — TEMPERATURE: 97.9 F | RESPIRATION RATE: 18 BRPM

## 2020-02-29 VITALS — HEART RATE: 70 BPM | SYSTOLIC BLOOD PRESSURE: 120 MMHG | DIASTOLIC BLOOD PRESSURE: 76 MMHG

## 2020-02-29 DIAGNOSIS — F41.9: ICD-10-CM

## 2020-02-29 DIAGNOSIS — Z79.3: ICD-10-CM

## 2020-02-29 DIAGNOSIS — F31.9: ICD-10-CM

## 2020-02-29 DIAGNOSIS — G89.18: ICD-10-CM

## 2020-02-29 DIAGNOSIS — Z79.899: ICD-10-CM

## 2020-02-29 DIAGNOSIS — F17.200: ICD-10-CM

## 2020-02-29 DIAGNOSIS — M79.605: ICD-10-CM

## 2020-02-29 DIAGNOSIS — M54.5: Primary | ICD-10-CM

## 2020-02-29 LAB
ANION GAP SERPL CALC-SCNC: 9 MMOL/L
APTT BLD: 24.4 SEC (ref 22–30)
BASOPHILS # BLD AUTO: 0 K/UL (ref 0–0.2)
BASOPHILS NFR BLD AUTO: 0 %
BUN SERPL-SCNC: 10 MG/DL (ref 7–17)
CALCIUM SPEC-MCNC: 9.4 MG/DL (ref 8.4–10.2)
CHLORIDE SERPL-SCNC: 104 MMOL/L (ref 98–107)
CO2 SERPL-SCNC: 26 MMOL/L (ref 22–30)
EOSINOPHIL # BLD AUTO: 0.1 K/UL (ref 0–0.7)
EOSINOPHIL NFR BLD AUTO: 1 %
ERYTHROCYTE [DISTWIDTH] IN BLOOD BY AUTOMATED COUNT: 5.18 M/UL (ref 3.8–5.4)
ERYTHROCYTE [DISTWIDTH] IN BLOOD: 13 % (ref 11.5–15.5)
GLUCOSE SERPL-MCNC: 101 MG/DL (ref 74–99)
HCT VFR BLD AUTO: 46.3 % (ref 34–46)
HGB BLD-MCNC: 15.3 GM/DL (ref 11.4–16)
INR PPP: 0.9 (ref ?–1.2)
LYMPHOCYTES # SPEC AUTO: 1.4 K/UL (ref 1–4.8)
LYMPHOCYTES NFR SPEC AUTO: 17 %
MCH RBC QN AUTO: 29.5 PG (ref 25–35)
MCHC RBC AUTO-ENTMCNC: 33 G/DL (ref 31–37)
MCV RBC AUTO: 89.3 FL (ref 80–100)
MONOCYTES # BLD AUTO: 0.4 K/UL (ref 0–1)
MONOCYTES NFR BLD AUTO: 5 %
NEUTROPHILS # BLD AUTO: 6 K/UL (ref 1.3–7.7)
NEUTROPHILS NFR BLD AUTO: 75 %
PLATELET # BLD AUTO: 213 K/UL (ref 150–450)
POTASSIUM SERPL-SCNC: 4.7 MMOL/L (ref 3.5–5.1)
PT BLD: 9.7 SEC (ref 9–12)
SODIUM SERPL-SCNC: 139 MMOL/L (ref 137–145)
WBC # BLD AUTO: 8 K/UL (ref 3.8–10.6)

## 2020-02-29 PROCEDURE — 36415 COLL VENOUS BLD VENIPUNCTURE: CPT

## 2020-02-29 PROCEDURE — 96374 THER/PROPH/DIAG INJ IV PUSH: CPT

## 2020-02-29 PROCEDURE — 96375 TX/PRO/DX INJ NEW DRUG ADDON: CPT

## 2020-02-29 PROCEDURE — 72132 CT LUMBAR SPINE W/DYE: CPT

## 2020-02-29 PROCEDURE — 85025 COMPLETE CBC W/AUTO DIFF WBC: CPT

## 2020-02-29 PROCEDURE — 80048 BASIC METABOLIC PNL TOTAL CA: CPT

## 2020-02-29 PROCEDURE — 99284 EMERGENCY DEPT VISIT MOD MDM: CPT

## 2020-02-29 PROCEDURE — 85730 THROMBOPLASTIN TIME PARTIAL: CPT

## 2020-02-29 PROCEDURE — 85610 PROTHROMBIN TIME: CPT

## 2020-02-29 NOTE — ED
SUBJECTIVE:   Bel Reardon is a 77 year old female who presents to clinic today for the following health issues:      ED/UC Followup:    Facility:  [facility] [place]- ED  Date of visit: 8/20/17  Reason for visit: Pharyngitis   Current Status: Feels much better         ED/UC Followup:    Facility:  [facility]  Date of visit: 8/25/17  Reason for visit: Pharyngitis   Current Status: Feels much better     10 days of amoxicillin total.       Problem list and histories reviewed & adjusted, as indicated.  Additional history: as documented    Current Outpatient Prescriptions   Medication Sig Dispense Refill     LORazepam (ATIVAN) 1 MG tablet Take 0.5-1 tablets (0.5-1 mg) by mouth every 8 hours as needed for anxiety Take 30 minutes prior to departure.  Do not operate a vehicle after taking this medication 20 tablet 0     allopurinol (ZYLOPRIM) 100 MG tablet TAKE ONE & ONE-HALF TABLETS BY MOUTH DAILY 135 tablet 1     valsartan (DIOVAN) 80 MG tablet TAKE ONE TABLET TWO TIMES A DAY BY MOUTH - GENERIC FOR DIOVAN 180 tablet 1     ONE TOUCH ULTRA test strip TEST BLOOD SURGARS ONCE A  each 3     metFORMIN (GLUCOPHAGE) 500 MG tablet TAKE 1 TABLET WITH SUPPER FOR THE FIRST WEEK, AFTER THE FIRST WEEK TAKE 1 TABLET WITH BREAKFAST AND 1 TABLET WITH SUPPER. 60 tablet 3     Omega-3 Fatty Acids (FISH OIL) 1000 MG CPDR Take 1 capsule by mouth daily       blood glucose monitoring (ONE TOUCH ULTRA MINI) meter device kit 1 kit by In Vitro route daily Use to test blood sugar 1 times daily or as directed. 1 kit 0     levothyroxine (SYNTHROID/LEVOTHROID) 25 MCG tablet TAKE 1 TABLET DAILY BY MOUT H - GENERIC FOR SYNTHROID 90 tablet 3     blood glucose monitoring (ONE TOUCH DELICA) lancets Use to test blood sugar 1 times daily or as directed. 1 Box 12     Labs reviewed in EPIC    Reviewed and updated as needed this visit by clinical staff  Tobacco  Allergies  Meds  Problems  Med Hx  Surg Hx  Fam Hx  Soc Hx        Reviewed and  Back Pain HPI





- General


Chief Complaint: Back Pain/Injury


Stated Complaint: Back Pain


Time Seen by Provider: 20 03:51


Source: patient, EMS


Limitations: no limitations





- History of Present Illness


Initial Comments: 





This patient is a 29-year-old woman presenting to be evaluated for low back pain

that also does radiate to her left leg.  The patient states that she had been at

[facility] in the early afternoon, probably around 1:30 PM.  She 

states that they were performing a lumbar puncture to check her CSF pressure 

related to concerns about possible pseudotumor.  The patient states that 

apparently there was some difficulty in the procedure did require multiple 

attempts.  She states that she had some soreness and then went home and the 

soreness only increased.  She states that it is a sharp pain, it becomes severe 

if she is up and moving.  She states that it is tolerable when she lies still.  

No loss of bladder or bowel function.  No saddle anesthesia.


MD Complaint: back pain


-: hour(s)


Similar Symptoms Previously: No


Place: home


Radiation: left leg


Severity: severe


Quality: sharp


Consistency: intermittent


Improves With: immobilization


Worsens With: movement


Context: other (Following attempted lumbar puncture.)


Associated Symptoms: denies other symptoms





- Related Data


                                Home Medications











 Medication  Instructions  Recorded  Confirmed


 


Citalopram Hydrobromide [CeleXA] 10 mg PO DAILY@1900 17


 


Aviane 0.10/0.02 Birth Control 1 tab PO DAILY@1900 18


 


Ibuprofen [Motrin Ib] 400 mg PO Q6H PRN 18








                                  Previous Rx's











 Medication  Instructions  Recorded


 


Albuterol Inhaler [Ventolin Hfa 1 - 2 puff INHALATION RT-Q6H PRN 18





Inhaler] #1 inhaler 


 


Dicyclomine [Bentyl] 20 mg PO QID #20 tablet 18


 


predniSONE [Deltasone] 40 mg PO DAILY 5 Days #10 tab 18


 


Methocarbamol [Robaxin-750] 750 mg PO TID PRN #30 tablet 20


 


predniSONE 60 mg PO DAILY #30 tab 20











                                    Allergies











Allergy/AdvReac Type Severity Reaction Status Date / Time


 


No Known Allergies Allergy   Verified 18 13:52














Review of Systems


ROS Statement: 


Those systems with pertinent positive or pertinent negative responses have been 

documented in the HPI.





ROS Other: All systems not noted in ROS Statement are negative.


Constitutional: Denies: fever, chills, weakness


Respiratory: Denies: cough, dyspnea


Cardiovascular: Denies: chest pain, palpitations, edema


Gastrointestinal: Denies: abdominal pain, vomiting, diarrhea, constipation


Genitourinary: Denies: dysuria, hematuria


Musculoskeletal: Reports: as per HPI, back pain


Skin: Denies: rash


Neurological: Denies: headache, weakness, numbness, paresthesias





Past Medical History


Past Medical History: No Reported History


History of Any Multi-Drug Resistant Organisms: None Reported


Past Surgical History:  Section


Additional Past Surgical History / Comment(s): I&D of cyst


Past Anesthesia/Blood Transfusion Reactions: No Reported Reaction


Past Psychological History: Anxiety, Bipolar, PTSD


Smoking Status: Current every day smoker


Past Alcohol Use History: Occasional


Past Drug Use History: Marijuana





General Exam


Limitations: no limitations


General appearance: alert, in no apparent distress


Head exam: Present: atraumatic, normocephalic


Neck exam: Present: normal inspection, full ROM.  Absent: tenderness, 

meningismus


Respiratory exam: Present: normal lung sounds bilaterally.  Absent: respiratory 

distress, wheezes, rales, rhonchi, stridor


Cardiovascular Exam: Present: regular rate, normal rhythm, normal heart sounds. 

Absent: systolic murmur, diastolic murmur, rubs, gallop


GI/Abdominal exam: Present: soft.  Absent: tenderness, guarding, rebound


Extremities exam: Present: normal inspection, normal capillary refill.  Absent: 

pedal edema, calf tenderness


Back exam: Present: normal inspection, other (Ur some localized tenderness at 

the site of the procedure.).  Absent: CVA tenderness (R), CVA tenderness (L), p

araspinal tenderness, vertebral tenderness


Neurological exam: Present: alert, normal gait, reflexes normal.  Absent: motor 

sensory deficit


Skin exam: Present: warm, dry, intact, normal color.  Absent: rash





Course


                                   Vital Signs











  20





  03:49 06:44


 


Temperature 97.9 F 


 


Pulse Rate 74 70


 


Respiratory 18 18





Rate  


 


Blood Pressure 139/83 120/76


 


O2 Sat by Pulse 98 97





Oximetry  














Medical Decision Making





- Medical Decision Making





Shouldn't is 29-year-old woman presenting requesting pain relief following 

lumbar puncture.  The imaging study is unremarkable.  She was given analgesia 

and was feeling much better.  She was ambulating without any difficulty.  No 

signs of any developing cauda equina.  Discussed appropriate further care and 

follow-up.





- Lab Data


Result diagrams: 


                                 20 04:10





                                 20 04:10


                                   Lab Results











  20 Range/Units





  04:10 04:10 04:10 


 


WBC  8.0    (3.8-10.6)  k/uL


 


RBC  5.18    (3.80-5.40)  m/uL


 


Hgb  15.3    (11.4-16.0)  gm/dL


 


Hct  46.3 H    (34.0-46.0)  %


 


MCV  89.3    (80.0-100.0)  fL


 


MCH  29.5    (25.0-35.0)  pg


 


MCHC  33.0    (31.0-37.0)  g/dL


 


RDW  13.0    (11.5-15.5)  %


 


Plt Count  213    (150-450)  k/uL


 


Neutrophils %  75    %


 


Lymphocytes %  17    %


 


Monocytes %  5    %


 


Eosinophils %  1    %


 


Basophils %  0    %


 


Neutrophils #  6.0    (1.3-7.7)  k/uL


 


Lymphocytes #  1.4    (1.0-4.8)  k/uL


 


Monocytes #  0.4    (0-1.0)  k/uL


 


Eosinophils #  0.1    (0-0.7)  k/uL


 


Basophils #  0.0    (0-0.2)  k/uL


 


PT   9.7   (9.0-12.0)  sec


 


INR   0.9   (<1.2)  


 


APTT   24.4   (22.0-30.0)  sec


 


Sodium    139  (137-145)  mmol/L


 


Potassium    4.7  (3.5-5.1)  mmol/L


 


Chloride    104  ()  mmol/L


 


Carbon Dioxide    26  (22-30)  mmol/L


 


Anion Gap    9  mmol/L


 


BUN    10  (7-17)  mg/dL


 


Creatinine    0.69  (0.52-1.04)  mg/dL


 


Est GFR (CKD-EPI)AfAm    >90  (>60 ml/min/1.73 sqM)  


 


Est GFR (CKD-EPI)NonAf    >90  (>60 ml/min/1.73 sqM)  


 


Glucose    101 H  (74-99)  mg/dL


 


Calcium    9.4  (8.4-10.2)  mg/dL














Disposition


Clinical Impression: 


 Back pain





Disposition: HOME SELF-CARE


Condition: Good


Instructions (If sedation given, give patient instructions):  Acute Low Back 

Pain (ED)


Prescriptions: 


predniSONE 60 mg PO DAILY #30 tab


Methocarbamol [Robaxin-750] 750 mg PO TID PRN #30 tablet


 PRN Reason: pain


Is patient prescribed a controlled substance at d/c from ED?: No


Referrals: 


Vi Rahman MD [Primary Care Provider] - 1-2 days "updated as needed this visit by Provider         ROS:  Constitutional, HEENT, cardiovascular, pulmonary, gi and gu systems are negative, except as otherwise noted.      OBJECTIVE:                                                    /82 (BP Location: Left arm, Patient Position: Chair, Cuff Size: Adult Regular)  Pulse 63  Temp 97.8  F (36.6  C) (Tympanic)  Ht 5' 1.5\" (1.562 m)  Wt 171 lb (77.6 kg)  SpO2 96%  BMI 31.79 kg/m2  Body mass index is 31.79 kg/(m^2).  GENERAL APPEARANCE: healthy, alert and no distress  HENT: ear canals and TM's normal and nose and mouth without ulcers or lesions  NECK: no adenopathy, no asymmetry, masses, or scars and thyroid normal to palpation  RESP: lungs clear to auscultation - no rales, rhonchi or wheezes  CV: regular rates and rhythm, normal S1 S2, no S3 or S4 and no murmur, click or rub  PSYCH: mentation appears normal and affect normal/bright       ASSESSMENT/PLAN:                                                    1. Viral URI with cough  Likely had virus all along as she didn't get any improvement from 1st round of zpack    2. Pneumonia due to infectious organism, unspecified laterality, unspecified part of lung  resolved    Patient was agreeable to this plan and had no further questions.  See Patient Instructions    Jennie Rubio MD  [facility] [place]  "

## 2020-02-29 NOTE — CT
EXAMINATION TYPE: CT lumbar spine w con

 

DATE OF EXAM: 2/29/2020

 

COMPARISON:

 

HISTORY: pain that radiates down right leg and upper back

 

CT DLP: 1716.60 mGycm

Automated exposure control for dose reduction was used.

 

CONTRAST: 

Performed with IV Contrast, patient injected with 100 mL of Isovue 300.

 

Multiple axial sections were obtained from the level of T12-S4 vertebra with no contrast.

 

Lumbar vertebra have normal alignment. Disc spaces are normal. There is mild posterior central L5-S1 
lumbar disc herniation. There is developmentally adequate spinal canal and no spinal stenosis. Sacroi
liac joints appear normal. There is no lumbar paraspinal mass. There is no compression fracture. I se
e no focal bone destruction. There is small posterior disc bulging also at L4-5. The contrast images 
show no pathologic enhancement. Abdominal aorta appears normal. Kidneys show satisfactory contrast op
acification. There is no hydronephrosis. No renal calculus.

 

IMPRESSION:

No spinal stenosis. No fracture. Posterior mild central L5-S1 lumbar disc herniation increased in siz
e compared to old CT scan.

## 2020-10-26 ENCOUNTER — HOSPITAL ENCOUNTER (INPATIENT)
Dept: HOSPITAL 47 - EC | Age: 30
LOS: 3 days | Discharge: HOME | DRG: 885 | End: 2020-10-29
Attending: PSYCHIATRY & NEUROLOGY | Admitting: PSYCHIATRY & NEUROLOGY
Payer: COMMERCIAL

## 2020-10-26 DIAGNOSIS — F43.10: ICD-10-CM

## 2020-10-26 DIAGNOSIS — Z79.899: ICD-10-CM

## 2020-10-26 DIAGNOSIS — F41.9: ICD-10-CM

## 2020-10-26 DIAGNOSIS — F12.10: ICD-10-CM

## 2020-10-26 DIAGNOSIS — R45.851: ICD-10-CM

## 2020-10-26 DIAGNOSIS — G47.00: ICD-10-CM

## 2020-10-26 DIAGNOSIS — F33.2: Primary | ICD-10-CM

## 2020-10-26 DIAGNOSIS — F60.3: ICD-10-CM

## 2020-10-26 DIAGNOSIS — F42.9: ICD-10-CM

## 2020-10-26 DIAGNOSIS — F17.210: ICD-10-CM

## 2020-10-26 DIAGNOSIS — Z81.8: ICD-10-CM

## 2020-10-26 DIAGNOSIS — R82.90: ICD-10-CM

## 2020-10-26 PROCEDURE — 99285 EMERGENCY DEPT VISIT HI MDM: CPT

## 2020-10-26 PROCEDURE — 80061 LIPID PANEL: CPT

## 2020-10-26 PROCEDURE — 83036 HEMOGLOBIN GLYCOSYLATED A1C: CPT

## 2020-10-26 PROCEDURE — 84443 ASSAY THYROID STIM HORMONE: CPT

## 2020-10-26 PROCEDURE — 80306 DRUG TEST PRSMV INSTRMNT: CPT

## 2020-10-26 PROCEDURE — 81025 URINE PREGNANCY TEST: CPT

## 2020-10-26 PROCEDURE — 80053 COMPREHEN METABOLIC PANEL: CPT

## 2020-10-26 PROCEDURE — 85025 COMPLETE CBC W/AUTO DIFF WBC: CPT

## 2020-10-26 PROCEDURE — 82075 ASSAY OF BREATH ETHANOL: CPT

## 2020-10-26 PROCEDURE — 81001 URINALYSIS AUTO W/SCOPE: CPT

## 2020-10-26 NOTE — ED
General Adult HPI





- General


Chief complaint: Psychiatric Symptoms


Stated complaint: Mental health


Time Seen by Provider: 10/26/20 21:00


Source: patient


Mode of arrival: ambulatory


Limitations: no limitations





- History of Present Illness


Initial comments: 


Dictation was produced using dragon dictation software. please excuse any 

grammatical, word or spelling errors. 





This patient was cared for during a federal and state declared state of 

emergency secondary to Covid 19





Chief Complaint: 30-year-old female presents with suicidal ideation





History of Present Illness: She is a 30-year-old female she's been under a lot 

of personal stress recently.  She's been feeling suicidal.  Patient's plan to 

overdose on pills.  Patient states she has not.  She denies any visual auditory 

hallucinations.  She presents to the emergency today with her .  Patient 

does have history of personality disorders.  She denies being diagnosed with any

sort of specific psychiatric disease.








The ROS documented in this emergency department record has been reviewed and 

confirmed by me.  Those systems with pertinent positive or negative responses 

have been documented in the HPI.  All other systems are other negative and/or 

noncontributory.








PHYSICAL EXAM:


General Impression: Alert and oriented x3, not in acute distress


HEENT: Normocephalic atraumatic, extra-ocular movements intact, pupils equal and

reactive to light bilaterally, mucous membranes moist.


Cardiovascular: Heart regular rate and rhythm


Chest: Able to complete full sentences, no retractions, no tachypnea


Abdomen: abdomen soft, non-tender, non-distended, no organomegaly


Musculoskeletal: Pulses present and equal in all extremities, no peripheral 

edema


Motor:  no focal deficits noted


Neurological: CN II-XII grossly intact, no focal motor or sensory deficits noted


Skin: Intact with no visualized rashes


Psych: Tearful





ED course: 30-year-old male presents with suicidal ideation.  Signs upon arrival

shows heart rate of 106, rest vital signs within acceptable limits.  Patient 

does not appear to be psychotic at this time.


Patient evaluated by EPS recommended inpatient psychiatric admission.  Patient 

is voluntarily admitted.

















- Related Data


                                Home Medications











 Medication  Instructions  Recorded  Confirmed


 


Citalopram Hydrobromide [CeleXA] 10 mg PO HS 12/18/17 10/26/20


 


ARIPiprazole [Abilify] 5 mg PO HS 10/26/20 10/26/20


 


Aviane 0.1-0.02 Tablet 1 tab PO HS 10/26/20 10/26/20


 


busPIRone HCl [Buspar] 5 mg PO BID 10/26/20 10/26/20


 


lamoTRIgine [LaMICtal] 150 mg PO HS 10/26/20 10/26/20











                                    Allergies











Allergy/AdvReac Type Severity Reaction Status Date / Time


 


No Known Allergies Allergy   Verified 10/26/20 22:10














Review of Systems


ROS Statement: 


Those systems with pertinent positive or pertinent negative responses have been 

documented in the HPI.





ROS Other: All systems not noted in ROS Statement are negative.





Past Medical History


Past Medical History: No Reported History


Additional Past Medical History / Comment(s): OCD, borderline personality 

disorder.


History of Any Multi-Drug Resistant Organisms: None Reported


Past Surgical History:  Section


Additional Past Surgical History / Comment(s): I&D of cyst to buttocks


Past Anesthesia/Blood Transfusion Reactions: No Reported Reaction


Past Psychological History: Anxiety, Bipolar, PTSD


Smoking Status: Current every day smoker


Past Alcohol Use History: Occasional


Past Drug Use History: Marijuana





General Exam


Limitations: no limitations





Course


                                   Vital Signs











  10/26/20





  20:54


 


Temperature 98.6 F


 


Pulse Rate 106 H


 


Respiratory 18





Rate 


 


Blood Pressure 106/70


 


O2 Sat by Pulse 98





Oximetry 














Medical Decision Making





- Lab Data


                                   Lab Results











  10/26/20 10/26/20 Range/Units





  21:23 21:31 


 


Urine HCG, Qual  Not Detected   (Not Detectd)  


 


Urine Opiates Screen   Not Detected  (NotDetected)  


 


Ur Oxycodone Screen   Not Detected  (NotDetected)  


 


Urine Methadone Screen   Not Detected  (NotDetected)  


 


Ur Propoxyphene Screen   Not Detected  (NotDetected)  


 


Ur Barbiturates Screen   Not Detected  (NotDetected)  


 


U Tricyclic Antidepress   Not Detected  (NotDetected)  


 


Ur Phencyclidine Scrn   Not Detected  (NotDetected)  


 


Ur Amphetamines Screen   Detected H  (NotDetected)  


 


U Methamphetamines Scrn   Not Detected  (NotDetected)  


 


U Benzodiazepines Scrn   Not Detected  (NotDetected)  


 


Urine Cocaine Screen   Not Detected  (NotDetected)  


 


U Marijuana (THC) Screen   Detected H  (NotDetected)  














Disposition


Clinical Impression: 


 Suicidal ideation





Disposition: ADMITTED AS IP TO THIS [facility]


Condition: Fair


Referrals: 


Vi Rahman MD [Primary Care Provider] - 1-2 days


Decision Time: 22:47

## 2020-10-27 LAB
ALBUMIN SERPL-MCNC: 4.4 G/DL (ref 3.5–5)
ALP SERPL-CCNC: 71 U/L (ref 38–126)
ALT SERPL-CCNC: 34 U/L (ref 4–34)
ANION GAP SERPL CALC-SCNC: 7 MMOL/L
AST SERPL-CCNC: 36 U/L (ref 14–36)
BASOPHILS # BLD AUTO: 0 K/UL (ref 0–0.2)
BASOPHILS NFR BLD AUTO: 0 %
BUN SERPL-SCNC: 14 MG/DL (ref 7–17)
CALCIUM SPEC-MCNC: 8.9 MG/DL (ref 8.4–10.2)
CHLORIDE SERPL-SCNC: 103 MMOL/L (ref 98–107)
CHOLEST SERPL-MCNC: 167 MG/DL (ref ?–200)
CO2 SERPL-SCNC: 27 MMOL/L (ref 22–30)
EOSINOPHIL # BLD AUTO: 0.2 K/UL (ref 0–0.7)
EOSINOPHIL NFR BLD AUTO: 2 %
ERYTHROCYTE [DISTWIDTH] IN BLOOD BY AUTOMATED COUNT: 5.16 M/UL (ref 3.8–5.4)
ERYTHROCYTE [DISTWIDTH] IN BLOOD: 13.5 % (ref 11.5–15.5)
GLUCOSE SERPL-MCNC: 103 MG/DL (ref 74–99)
HBA1C MFR BLD: 5 % (ref 4–6)
HCT VFR BLD AUTO: 47.7 % (ref 34–46)
HDLC SERPL-MCNC: 70 MG/DL (ref 40–60)
HGB BLD-MCNC: 15.2 GM/DL (ref 11.4–16)
LDLC SERPL CALC-MCNC: 71 MG/DL (ref 0–99)
LYMPHOCYTES # SPEC AUTO: 2.1 K/UL (ref 1–4.8)
LYMPHOCYTES NFR SPEC AUTO: 23 %
MCH RBC QN AUTO: 29.5 PG (ref 25–35)
MCHC RBC AUTO-ENTMCNC: 31.9 G/DL (ref 31–37)
MCV RBC AUTO: 92.4 FL (ref 80–100)
MONOCYTES # BLD AUTO: 0.5 K/UL (ref 0–1)
MONOCYTES NFR BLD AUTO: 5 %
NEUTROPHILS # BLD AUTO: 6.3 K/UL (ref 1.3–7.7)
NEUTROPHILS NFR BLD AUTO: 69 %
PH UR: 6.5 [PH] (ref 5–8)
PLATELET # BLD AUTO: 261 K/UL (ref 150–450)
POTASSIUM SERPL-SCNC: 4.4 MMOL/L (ref 3.5–5.1)
PROT SERPL-MCNC: 7.7 G/DL (ref 6.3–8.2)
RBC UR QL: 3 /HPF (ref 0–5)
SODIUM SERPL-SCNC: 137 MMOL/L (ref 137–145)
SP GR UR: 1.03 (ref 1–1.03)
SQUAMOUS UR QL AUTO: 11 /HPF (ref 0–4)
TRIGL SERPL-MCNC: 130 MG/DL (ref ?–150)
UROBILINOGEN UR QL STRIP: <2 MG/DL (ref ?–2)
WBC # BLD AUTO: 9.1 K/UL (ref 3.8–10.6)
WBC #/AREA URNS HPF: 10 /HPF (ref 0–5)

## 2020-10-27 RX ADMIN — NICOTINE SCH PATCH: 14 PATCH, EXTENDED RELEASE TRANSDERMAL at 09:19

## 2020-10-27 RX ADMIN — ACETAMINOPHEN PRN MG: 325 TABLET, FILM COATED ORAL at 09:19

## 2020-10-27 RX ADMIN — ASPIRIN SCH: 325 TABLET ORAL at 01:23

## 2020-10-27 RX ADMIN — CITALOPRAM HYDROBROMIDE SCH MG: 20 TABLET ORAL at 20:14

## 2020-10-27 RX ADMIN — ASPIRIN SCH: 325 TABLET ORAL at 21:26

## 2020-10-27 RX ADMIN — NICOTINE SCH: 14 PATCH, EXTENDED RELEASE TRANSDERMAL at 01:24
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
## 2020-10-27 NOTE — P.CONS
History of Present Illness





- Reason for Consult


Consult date: 10/27/20





- History of Present Illness





Patient is a 80-year-old female with a PMH of depression who presented to the 

emergency room with complaints of depressive thoughts and suicidal ideation.  

The patient reports that she was planning on overdosing on many of her home 

medications reviewed.  Instead, the patient decided to come to the emergency 

room.  She reported continued feelings of depression along with PTSD.  She 

denied any additional complaints however.  She denied fever, chills, cough, 

chest pain, shortness of breath, nausea, vomiting, abdominal pain. 





Review of Systems





Pertinent positives and negatives as discussed in HPI, a complete review of 

systems was performed and all other systems are negative.





Past Medical History


Past Medical History: No Reported History


Additional Past Medical History / Comment(s): OCD, borderline personality 

disorder.


History of Any Multi-Drug Resistant Organisms: None Reported


Past Surgical History:  Section


Additional Past Surgical History / Comment(s): I&D of cyst to buttocks


Past Anesthesia/Blood Transfusion Reactions: No Reported Reaction


Past Psychological History: Anxiety, Bipolar, PTSD


Smoking Status: Current every day smoker


Past Alcohol Use History: Occasional


Past Drug Use History: Marijuana





Medications and Allergies


                                Home Medications











 Medication  Instructions  Recorded  Confirmed  Type


 


Citalopram Hydrobromide [CeleXA] 10 mg PO HS 12/18/17 10/26/20 History


 


ARIPiprazole [Abilify] 5 mg PO HS 10/26/20 10/26/20 History


 


Aviane 0.1-0.02 Tablet 1 tab PO HS 10/26/20 10/26/20 History


 


busPIRone HCl [Buspar] 5 mg PO BID 10/26/20 10/26/20 History


 


lamoTRIgine [LaMICtal] 150 mg PO HS 10/26/20 10/26/20 History








                                    Allergies











Allergy/AdvReac Type Severity Reaction Status Date / Time


 


No Known Allergies Allergy   Verified 10/27/20 01:53














Physical Exam


Vitals: 


                                   Vital Signs











  Temp Pulse Pulse Resp BP BP Pulse Ox


 


 10/26/20 23:55  97.3 F L   90  18   138/67  96


 


 10/26/20 23:15  98.6 F  85   16  115/76   98


 


 10/26/20 20:54  98.6 F  106 H   18  106/70   98








                                Intake and Output











 10/26/20 10/26/20 10/27/20





 14:59 22:59 06:59


 


Other:   


 


  Weight  108.862 kg 108.862 kg











General: non toxic, no distress, appears at stated age, morbidly obese


Derm: no unusual rashes/lesions no unusual ecchymoses, warm, dry


Head: atraumatic, normocephalic, symmetric


Eyes: EOMI, no lid lag, anicteric sclera, pupils equal round reactive to light


ENT: Nose and ears atraumatic, no thrush,  no pharyngeal erythema


Neck: No thyromegaly, no cervical lymphadenopathy, trachea midline, supple


Mouth: no lip lesion, mucus membranes moist


Cardiovascular: S1S2 reg, no murmur, positive posterior tibial pulse bilateral, 

no edema, capillary refill less than 2 seconds


Lungs: CTA bilateral, no rhonchi, no rales , no accessory muscle use


Abdominal: soft,  nontender to palpation, no guarding, no appreciable 

organomegaly, normal bowel sounds


Ext: no gross muscle atrophy,  muscle strength 5 out of 5 in all 4 extremities 

grossly, no contractures, 


Neuro:  CN II-XI grossly intact, light touch intact all 4 extremities, finger to

 nose within normal limits,


Psych: Alert, oriented, depressed affect 





Results


Labs: 


                  Abnormal Lab Results - Last 24 Hours (Table)











  10/26/20 10/26/20 Range/Units





  21:20 21:31 


 


Urine Appearance  Cloudy H   (Clear)  


 


Urine Blood  Trace H   (Negative)  


 


Ur Leukocyte Esterase  Moderate H   (Negative)  


 


Urine WBC  10 H   (0-5)  /hpf


 


Ur Squamous Epith Cells  11 H   (0-4)  /hpf


 


Urine Mucus  Rare H   (None)  /hpf


 


Ur Amphetamines Screen   Detected H  (NotDetected)  


 


U Marijuana (THC) Screen   Detected H  (NotDetected)  














Assessment and Plan


Plan: 





Abnormal UA


-Patient denying urinary complaints


-Likely colonization


-Hold off on antibiotics at this time





Depression and suicidal ideation


-As per psychiatry





Thank you for allowing us to participate in the care of this patient.  We will 

follow peripherally.  Do not hesitate to contact us with questions.  Someone can

 be reached from the [facility] hospitalist group at all hours of the day 

at [phone].

## 2020-10-27 NOTE — P.PN
Progress Note - Text


Progress Note Date: 10/27/20








Please cancel consult for me patient was already seen by [facility] physician

## 2020-10-28 RX ADMIN — NICOTINE SCH PATCH: 14 PATCH, EXTENDED RELEASE TRANSDERMAL at 08:33

## 2020-10-28 RX ADMIN — ASPIRIN SCH: 325 TABLET ORAL at 20:09

## 2020-10-28 RX ADMIN — CITALOPRAM HYDROBROMIDE SCH MG: 20 TABLET ORAL at 20:09

## 2020-10-28 NOTE — P.PN
Progress Note - Text


Progress Note Date: 10/28/20





Interval History:


Patient was seen sitting in a group this morning and was directable and agreea

ble to speak with writer in the office.  Patient appears to have mild 

improvement hygiene and grooming today.  She claims that she is feeling upset 

today because one of the new patients on the unit was disrupting group and 

needed to be kicked out.  Patient states that this event was making her anxious 

as well.  She states that other than that her mood has been improving on the 

unit and thanked writer for her medications.  She states that she is also 

speaking to her  who claims that he will be sending his daughter away to 

live at a friend's house for 2 weeks so that they can figure out further living 

arrangements and a plan going forward.  She states that they will also be 

looking into doing family therapy.  She states that she does not feel suicidal 

today.  He claims that she feels "a little groggy today".  She claims that she 

is able to sleep throughout the night. At this time patient denies any suicidal 

or homical ideations, intent or plan. Patient denies any auditory, visual 

hallucinations and denies any paranoia or delusions. Patient denies any side 

effects from the medications and has been compliant with meds. 





Mental Status Exam:


General Appearance: Patient appears to be stated age is obese, alert, 

directable, and attempts to cooperate.  Patient appears to have poor hygiene and

grooming.  Several tattoos.


Behavior: Patient is seated without any agitated behavior.  Constricted and flat

affect.


Speech: Patient's speech is fluent and nonpressured.  Soft tone of voice


Mood/Affect: Patient reports their mood is improving mildly, affect is congruent




Suicidality/Homicidality:  Patient denies having any homicidal ideation intent 

or plan.  Denies any suicidal thoughts today intent or plan.  


Perceptions: Patient denies any visual hallucinations and denies any auditory 

hallucinations


Though content/process: There is no evidence of any delusional thought content 

and thought process is linear and goal-directed.  Focused on her triggers and 

other patients on the unit.  More future oriented today.


Memory and concentration: AOX3, grossly intact for the purposes of this session


Judgment and insight: poor, improving mildly





Assessment


Major depressive disorder, recurrent, severe, without psychotic features


Anxiety disorder unspecified


Borderline personality disorder


Cannabis use disorder


Nicotine dependence





Plan:


-Patient continues to meet criteria for inpatient psychiatric admission for 

symptom stabilization and safety. Patient has signed adult voluntary form and 

medication consent and was placed in patient's chart.


-Medications: Continue with Celexa 20 mg daily at bedtime for mood/anxiety.  

Continue Lamictal 150 mg nightly for mood stabilization/depression.  Continue 

with Abilify 5 mg daily at bedtime for mood adjunct/mood stabilization.  

Increase BuSpar to 15 mg 3 times a day for anxiety.  Decreased melatonin to 3 mg

daily at bedtime for insomnia.


-When necessary Ativan and Geodon for agitation/aggression.


-NRT - nicotine patch


-SW on board for discharge planning.  Encouraged the patient to participate in 

milieu.  to gather further collateral information and also conduct 

family meeting prior to patient's discharge to ensure safety and address 

concerns in the home.  The patient is well overnight and a safe discharge plan 

is made and patient will likely be discharged tomorrow back home.

## 2020-10-29 VITALS
SYSTOLIC BLOOD PRESSURE: 123 MMHG | TEMPERATURE: 98.4 F | RESPIRATION RATE: 17 BRPM | DIASTOLIC BLOOD PRESSURE: 63 MMHG | HEART RATE: 102 BPM

## 2020-10-29 RX ADMIN — ACETAMINOPHEN PRN MG: 325 TABLET, FILM COATED ORAL at 08:21

## 2020-10-29 RX ADMIN — NICOTINE SCH PATCH: 14 PATCH, EXTENDED RELEASE TRANSDERMAL at 08:22
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
## 2020-11-24 ENCOUNTER — HOSPITAL ENCOUNTER (EMERGENCY)
Dept: HOSPITAL 47 - EC | Age: 30
Discharge: HOME | End: 2020-11-24
Payer: COMMERCIAL

## 2020-11-24 VITALS
DIASTOLIC BLOOD PRESSURE: 84 MMHG | RESPIRATION RATE: 18 BRPM | HEART RATE: 106 BPM | SYSTOLIC BLOOD PRESSURE: 129 MMHG | TEMPERATURE: 98.4 F
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
## 2020-11-24 DIAGNOSIS — M54.5: Primary | ICD-10-CM

## 2020-11-24 DIAGNOSIS — F17.200: ICD-10-CM

## 2020-11-24 PROCEDURE — 99283 EMERGENCY DEPT VISIT LOW MDM: CPT

## 2020-11-24 PROCEDURE — 96372 THER/PROPH/DIAG INJ SC/IM: CPT

## 2020-11-24 NOTE — ED
General Adult HPI





- General


Chief complaint: Back Pain/Injury


Stated complaint: back pain


Time Seen by Provider: 20 17:21


Source: patient, family, RN notes reviewed


Mode of arrival: wheelchair


Limitations: no limitations





- History of Present Illness


Initial comments: 





Patient is a pleasant 30-year-old female presenting to the emergency Department 

with complaints of low back pain.  Onset of symptoms was chronic.  Patient was 

bending over today around 11:00 and had some increased pain.  Pain now radiates 

towards right leg.  No tendons retention of bowel or bladder.  No leg weakness. 

Patient does have history of 2 previous lumbar punctures which she feels is 

related to her chronic back pain.  Patient does not see a doctor or has had 

previous studies regarding her back problems.





- Related Data


                                Home Medications











 Medication  Instructions  Recorded  Confirmed


 


Aviane 0.1-0.02 Tablet 1 tab PO HS 10/26/20 10/26/20








                                  Previous Rx's











 Medication  Instructions  Recorded


 


ARIPiprazole [Abilify] 5 mg PO HS 30 Days  tab 10/29/20


 


Acetaminophen Tab [Tylenol] 650 mg PO Q4HR PRN  tab 10/29/20


 


Citalopram Hydrobromide [CeleXA] 20 mg PO HS 30 Days  tab 10/29/20


 


Melatonin 3 mg PO HS  tablet 10/29/20


 


Nicotine 14Mg/24Hr Patch [Habitrol] 1 patch TRANSDERM DAILY 14 Days 10/29/20





 patch 


 


busPIRone HCL [Buspar] 15 mg PO TID 30 Days  tab 10/29/20


 


lamoTRIgine [LaMICtal] 150 mg PO HS 30 Days  tab 10/29/20


 


Cyclobenzaprine [Flexeril] 10 mg PO TID PRN #12 tablet 20


 


Ibuprofen [Motrin] 600 mg PO Q6HR PRN #20 tab 20











                                    Allergies











Allergy/AdvReac Type Severity Reaction Status Date / Time


 


No Known Allergies Allergy   Verified 20 17:16














Review of Systems


ROS Statement: 


Those systems with pertinent positive or pertinent negative responses have been 

documented in the HPI.





ROS Other: All systems not noted in ROS Statement are negative.


Constitutional: Denies: fever


Eyes: Denies: eye pain


ENT: Denies: ear pain


Respiratory: Denies: cough


Cardiovascular: Denies: chest pain


Endocrine: Denies: fatigue


Gastrointestinal: Denies: abdominal pain


Genitourinary: Denies: dysuria


Musculoskeletal: Reports: as per HPI, back pain


Skin: Denies: rash


Neurological: Denies: weakness





Past Medical History


Past Medical History: No Reported History


Additional Past Medical History / Comment(s): OCD, borderline personality 

disorder.


History of Any Multi-Drug Resistant Organisms: None Reported


Past Surgical History:  Section


Additional Past Surgical History / Comment(s): I&D of cyst to buttocks


Past Anesthesia/Blood Transfusion Reactions: No Reported Reaction


Past Psychological History: Anxiety, Bipolar, PTSD


Smoking Status: Current every day smoker


Past Alcohol Use History: Occasional


Past Drug Use History: Marijuana





General Exam


Limitations: no limitations


General appearance: alert, in no apparent distress


Head exam: Present: normocephalic


Eye exam: Present: normal appearance


Neck exam: Present: normal inspection


Respiratory exam: Present: normal lung sounds bilaterally


Cardiovascular Exam: Present: regular rate, normal rhythm


  ** Expanded


Peripheral pulses: 2+: Posterior Tibialis (R), Posterior Tibialis (L), Dorsalis 

Pedis (R), Dorsalis Pedis (L)


GI/Abdominal exam: Present: soft.  Absent: distended, tenderness, pulsatile mass


Extremities exam: Present: normal inspection, other (Right leg raise positive on

the right around 30)


Back exam: Present: vertebral tenderness (Mild tenderness lower lumbar region)


Neurological exam: Present: alert.  Absent: motor sensory deficit


  ** Expanded


Sensory exam: Lower Extremity Light Touch: Normal


Motor strength exam: RLE: 5, LLE: 5


Psychiatric exam: Present: normal affect, normal mood


Skin exam: Present: normal color





Course





                                   Vital Signs











  20





  17:12


 


Temperature 98.4 F


 


Pulse Rate 106 H


 


Respiratory 18





Rate 


 


Blood Pressure 129/84


 


O2 Sat by Pulse 99





Oximetry 














- Reevaluation(s)


Reevaluation #1: 





20 17:42


Patient is offered x-rays however refuses





Disposition


Clinical Impression: 


 Low back pain





Disposition: HOME SELF-CARE


Condition: Stable


Instructions (If sedation given, give patient instructions):  Acute Low Back 

Pain (ED)


Additional Instructions: 


Prescription sent to your pharmacy.  [facility].  Please follow-up with primary 

care physician in the next day or 2 for recheck.  Return for fever, uncontrolled

pain, weakness or loss of sensation, loss of control of bowel or bladder, worse

ngoc symptoms or other concerns.  Consider physical therapy.


Prescriptions: 


Cyclobenzaprine [Flexeril] 10 mg PO TID PRN #12 tablet


 PRN Reason: Pain


Ibuprofen [Motrin] 600 mg PO Q6HR PRN #20 tab


 PRN Reason: Pain


Is patient prescribed a controlled substance at d/c from ED?: No


Referrals: 


Vi Rahman MD [Primary Care Provider] - 1-2 days


Time of Disposition: 17:45

## 2022-01-21 ENCOUNTER — HOSPITAL ENCOUNTER (INPATIENT)
Dept: HOSPITAL 47 - EC | Age: 32
LOS: 4 days | Discharge: HOME | DRG: 885 | End: 2022-01-25
Attending: PSYCHIATRY & NEUROLOGY | Admitting: PSYCHIATRY & NEUROLOGY
Payer: COMMERCIAL

## 2022-01-21 VITALS — BODY MASS INDEX: 56.2 KG/M2

## 2022-01-21 DIAGNOSIS — F43.10: ICD-10-CM

## 2022-01-21 DIAGNOSIS — F22: ICD-10-CM

## 2022-01-21 DIAGNOSIS — F11.10: ICD-10-CM

## 2022-01-21 DIAGNOSIS — F31.81: ICD-10-CM

## 2022-01-21 DIAGNOSIS — F42.9: ICD-10-CM

## 2022-01-21 DIAGNOSIS — F12.90: ICD-10-CM

## 2022-01-21 DIAGNOSIS — F17.210: ICD-10-CM

## 2022-01-21 DIAGNOSIS — F31.30: Primary | ICD-10-CM

## 2022-01-21 DIAGNOSIS — Z79.899: ICD-10-CM

## 2022-01-21 DIAGNOSIS — K21.9: ICD-10-CM

## 2022-01-21 DIAGNOSIS — Z20.822: ICD-10-CM

## 2022-01-21 DIAGNOSIS — E66.01: ICD-10-CM

## 2022-01-21 DIAGNOSIS — R45.851: ICD-10-CM

## 2022-01-21 DIAGNOSIS — F10.10: ICD-10-CM

## 2022-01-21 DIAGNOSIS — G47.00: ICD-10-CM

## 2022-01-21 DIAGNOSIS — F60.3: ICD-10-CM

## 2022-01-21 DIAGNOSIS — G89.29: ICD-10-CM

## 2022-01-21 LAB
PH UR: 6 [PH] (ref 5–8)
PROT UR QL: (no result)
RBC UR QL: >182 /HPF (ref 0–5)
SP GR UR: 1.03 (ref 1–1.03)
SQUAMOUS UR QL AUTO: 6 /HPF (ref 0–4)
UROBILINOGEN UR QL STRIP: <2 MG/DL (ref ?–2)
WBC #/AREA URNS HPF: 33 /HPF (ref 0–5)

## 2022-01-21 PROCEDURE — 81001 URINALYSIS AUTO W/SCOPE: CPT

## 2022-01-21 PROCEDURE — 81025 URINE PREGNANCY TEST: CPT

## 2022-01-21 PROCEDURE — 99285 EMERGENCY DEPT VISIT HI MDM: CPT

## 2022-01-21 PROCEDURE — 82075 ASSAY OF BREATH ETHANOL: CPT

## 2022-01-21 PROCEDURE — 80306 DRUG TEST PRSMV INSTRMNT: CPT

## 2022-01-21 PROCEDURE — 80053 COMPREHEN METABOLIC PANEL: CPT

## 2022-01-21 PROCEDURE — 85025 COMPLETE CBC W/AUTO DIFF WBC: CPT

## 2022-01-21 PROCEDURE — 83036 HEMOGLOBIN GLYCOSYLATED A1C: CPT

## 2022-01-21 PROCEDURE — 87635 SARS-COV-2 COVID-19 AMP PRB: CPT

## 2022-01-21 PROCEDURE — 84443 ASSAY THYROID STIM HORMONE: CPT

## 2022-01-21 PROCEDURE — 80061 LIPID PANEL: CPT

## 2022-01-21 RX ADMIN — CYCLOBENZAPRINE HYDROCHLORIDE PRN MG: 10 TABLET, FILM COATED ORAL at 20:55

## 2022-01-21 RX ADMIN — NICOTINE SCH PATCH: 14 PATCH, EXTENDED RELEASE TRANSDERMAL at 17:13

## 2022-01-21 NOTE — ED
General Adult HPI





- General


Chief complaint: Psychiatric Symptoms


Stated complaint: Mental Health


Time Seen by Provider: 22 10:07


Source: patient


Mode of arrival: ambulatory


Limitations: no limitations





- History of Present Illness


Initial comments: 





31-year-old female presents to the emergency room for suicidal thoughts.  

Patient states she always has some suicidal thoughts with a worsen significantly

about a week ago.  Patient states she felt like calling 911 yesterday because 

she wanted to drive into traffic.  Patient also states that last night she tried

to cut herself but the knife was to go.  Patient is tearful.Patient has no other

complaints at this time including shortness of breath, chest pain, abdominal 

pain, nausea or vomiting, headache, or visual changes.





- Related Data


                                Home Medications











 Medication  Instructions  Recorded  Confirmed


 


Aviane 0.1-0.02 Tablet 1 tab PO HS 10/26/20 01/21/22


 


ARIPiprazole [Abilify] 20 mg PO HS 22


 


Cyclobenzaprine [Flexeril] 10 mg PO DAILY PRN 22


 


Ergocalciferol [Vitamin D2 (1250 1,250 mcg PO WE 22





Mcg = 99036 Iu)]   


 


busPIRone HCL 15 mg PO TID 22


 


lamoTRIgine [LaMICtal] 200 mg PO HS 22


 


traZODone HCL [Desyrel] 100 mg PO HS 22











                                    Allergies











Allergy/AdvReac Type Severity Reaction Status Date / Time


 


No Known Allergies Allergy   Verified 22 11:06














Review of Systems


ROS Statement: 


Those systems with pertinent positive or pertinent negative responses have been 

documented in the HPI.





ROS Other: All systems not noted in ROS Statement are negative.





Past Medical History


Past Medical History: No Reported History


Additional Past Medical History / Comment(s): OCD, borderline personality 

disorder.


History of Any Multi-Drug Resistant Organisms: None Reported


Past Surgical History:  Section


Additional Past Surgical History / Comment(s): I&D of cyst to buttocks


Past Anesthesia/Blood Transfusion Reactions: No Reported Reaction


Past Psychological History: Anxiety, Bipolar, PTSD


Smoking Status: Current every day smoker


Past Alcohol Use History: Occasional


Past Drug Use History: Marijuana





General Exam


Limitations: no limitations


General appearance: alert, in no apparent distress


Head exam: Present: atraumatic


Eye exam: Present: normal appearance, PERRL, EOMI.  Absent: scleral icterus, 

conjunctival injection


ENT exam: Present: normal exam, mucous membranes moist


Neck exam: Present: normal inspection, full ROM.  Absent: tenderness


Respiratory exam: Present: normal lung sounds bilaterally.  Absent: respiratory 

distress, wheezes


Cardiovascular Exam: Present: regular rate, normal rhythm, normal heart sounds





Course


                                   Vital Signs











  22





  09:53


 


Temperature 97.9 F


 


Pulse Rate 102 H


 


Respiratory 22





Rate 


 


Blood Pressure 143/71


 


O2 Sat by Pulse 100





Oximetry 














Medical Decision Making





- Medical Decision Making





Vitals are stable.  Patient is anxious and crying.  Patient was evaluated by 

EPS.  Will be admitted for suicidal ideation.





- Lab Data


                                   Lab Results











  22 Range/Units





  10:10 15:10 


 


Urine Opiates Screen  Not Detected   (NotDetected)  


 


Ur Oxycodone Screen  Detected H   (NotDetected)  


 


Urine Methadone Screen  Not Detected   (NotDetected)  


 


Ur Propoxyphene Screen  Not Detected   (NotDetected)  


 


Ur Barbiturates Screen  Not Detected   (NotDetected)  


 


U Tricyclic Antidepress  Not Detected   (NotDetected)  


 


Ur Phencyclidine Scrn  Not Detected   (NotDetected)  


 


Ur Amphetamines Screen  Not Detected   (NotDetected)  


 


U Methamphetamines Scrn  Not Detected   (NotDetected)  


 


U Benzodiazepines Scrn  Not Detected   (NotDetected)  


 


Urine Cocaine Screen  Not Detected   (NotDetected)  


 


U Marijuana (THC) Screen  Detected H   (NotDetected)  


 


Coronavirus (PCR)   Not Detected  (Not Detectd)  














Disposition


Clinical Impression: 


 Suicidal thoughts





Disposition: TRANSFER TO PSYCH HOSP/UNIT


Is patient prescribed a controlled substance at d/c from ED?: No


Referrals: 


Jay Garcia MD [Primary Care Provider] - 1-2 days


Time of Disposition: 15:44

## 2022-01-22 RX ADMIN — NICOTINE SCH PATCH: 14 PATCH, EXTENDED RELEASE TRANSDERMAL at 08:13

## 2022-01-22 RX ADMIN — IBUPROFEN SCH MG: 400 TABLET, FILM COATED ORAL at 17:28

## 2022-01-22 RX ADMIN — LURASIDONE HYDROCHLORIDE SCH MG: 80 TABLET, FILM COATED ORAL at 17:31

## 2022-01-22 RX ADMIN — FAMOTIDINE SCH MG: 20 TABLET, FILM COATED ORAL at 20:48

## 2022-01-22 RX ADMIN — CYCLOBENZAPRINE HYDROCHLORIDE PRN MG: 10 TABLET, FILM COATED ORAL at 08:15

## 2022-01-22 RX ADMIN — IBUPROFEN SCH: 400 TABLET, FILM COATED ORAL at 23:39

## 2022-01-22 RX ADMIN — NICOTINE SCH: 21 PATCH, EXTENDED RELEASE TRANSDERMAL at 21:19

## 2022-01-22 NOTE — HP
HISTORY AND PHYSICAL



DATE OF SERVICE:

01/22/2022



IDENTIFYING DATA:

The patient is a 31-year-old female. She lives with her  and significant other.

She presented to the ED for evaluation.



CHIEF COMPLAINT:

The patient was depressed and having suicide thoughts.  She said she had to pull her

car over because she was afraid she would drive into traffic.



HISTORY OF PRESENT ILLNESS:

Patient has had long-term problems with depression.  She said that she has been

diagnosed with bipolar disorder, borderline personality disorder, OCD and PTSD.  She

notes that she has had episodes of mood swings.  She will get manic episodes where she

has high energy.  She may go on spending sprees.  She will have decreased need for

sleep.  She will have racing thoughts.  She said these episodes will last about one

week and then she will drop into depression.  She says that she barely has a "middle

ground."  She notes that in the last 2 months she has primarily been in depression and

has not seen manic symptoms.  It is noted that she has had one prior psychiatric

hospitalization at this facility October 27, 2020.  At that time she was diagnosed with

major depression.  Current medications that she has been on include Lamictal 200 mg a

day, BuSpar 15 mg 3 times a day, Abilify 20 mg a day and trazodone 100 mg at bedtime.

She said trazodone is a recent addition, though she has been on the other medications

for quite a period of time.  She is followed through [facility] and sees Dr. Anthony. She also

notes that in the recent past she had been on Pristiq, which she took for about 4

months, though has stopped it because she was not getting any benefits from that.  She

notes that currently she has been sleeping poorly, she has loss of motivation, energy

and interest.  She says she has persistent thoughts of suicide and that just more

recently suicide thoughts will get intense without any trigger.  She says in the past

some stress issue or another might set this off, though now things have worsened to the

point where these thoughts just come on her.  She notes that she does have what she

describes as "occasional hallucinations" where she might hear her name or some noises,

though it is nothing that is very persistent.  She says she does not get any command

hallucinations.  She also has some visual perception issues with lights and shadows.

She notes that there are times where she can get into delusional thinking when her

depression gets bad, though otherwise is not identifying any thoughts in that

direction.  She notes that she has OCD symptoms which mainly are intrusive thoughts

that can go over in her head repeatedly.  She notes that she will have a few compulsive

behaviors, such as the going through certain rituals or doing certain things 3 times

repeatedly. She describes traumatic issues from her past, though she did not go into

detail.  She has chronic back pain and notes that pain and depression seem to aggravate

one another.  She is admitted for further evaluation.



SUBSTANCE USE HISTORY:

Patient has been taking opioid pain medications which she gets on the street because

she does not have a prescription.  She usually takes it about 2 times a week.  She also

smokes marijuana on a daily basis.  She does not use other street drugs or alcohol.



PAST MEDICAL HISTORY:

Patient has obesity.  She says that she has set up an appointment with a dietitian and

also is setting up an appointment for evaluation for gastric bypass.  She has chronic

back pain.  She has had two full-term pregnancies.



FAMILY AND SOCIAL HISTORY:

Patient currently lives with her  and significant other girlfriend. Along with

the three of them there are two children in the house, ages 16 and 7.  She has some

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



MENTAL STATUS EXAM:

Patient was somewhat restless. She gave fairly good eye contact.  She answered

questions appropriately.  Her thoughts were clear, coherent and goal-directed.  She was

not too spontaneous, though she was interactive.  Her affect was anxious and somewhat

constricted.  She had a friendly manner.  Her mood was depressed.  She was

significantly distressed.  She reports some issues of hallucinations or intrusive

thoughts.  She acknowledged that she has had thoughts of harm that have been persistent

and have intensified of late.  She does have a past history of suicidality, with two

past suicide attempts of cutting and overdose. On cognitive exam, the patient was

oriented and alert.  Recent and remote memory were intact. She could recall 3/3 objects

in 4 minutes. She could spell "world" forward and backwards.  Insight and judgment were

fair, fund of knowledge average or somewhat above average.



PHYSICAL EXAM:

As per medical consultation.



ASSESSMENT:

This 31-year-old female is diagnosed with bipolar depression. In addition, she has

other psychiatric issues, including persistent mood swings and pervasive thoughts of

suicide.



Strengths include native intelligence and moderate insight into some of her issues.

Weakness includes substance use issues.



DIAGNOSIS:

1. Bipolar affective disorder, depressed phase, with possible psychotic features.

2. OCD by history.

3. PTSD by history.

4. Substance dependence and acute substance withdrawal, including marijuana and

    opioids.

5. Obesity.

6. Chronic back pain.



RECOMMENDATIONS:

Patient will be admitted for comprehensive medical, psychiatric and psychosocial

evaluation. We will engage the patient in individual and group therapeutic activities.

I had an extensive discussion with the patient regarding treatment issues relating to

her bipolar condition.  At this point I will discontinue Abilify and start her on

Latuda 80 mg a day. In addition, I will begin tapering her off of BuSpar, as it likely

has little or no benefit, which the patient confirms.  I will go down to 10 mg twice a

day.  She will continue Lamictal 200 mg a day. Lamictal does have indication to help

reduce relapse, to bipolar milena primarily and to a lesser degree bipolar depression.

Whether or not Lamictal should be a long-term medication for her remains to be seen,

given that it has not shown much benefit.  I discussed that there might be

consideration for addition of lithium, which also has indications for bipolar milena and

potentially may be appropriate for bipolar depression as well.  I will continue

trazodone 100 mg at bedtime.  I had an extensive discussion with the patient regarding

the need to be off of opioids and marijuana.  We discussed that once she goes through a

withdrawal period of 6 to 8 weeks off of opioids, there is a significant likelihood

that her chronic pain issues will be lessened. In addition, being off of opioids will

be of significant benefit in regard to stabilizing her mood disorder.  The same is the

case for her marijuana, which I also discussed.  I reviewed medication issues with the

patient, including indication, potential side effects and concerns related to

metabolics and movement disorder issues as it relates to Latuda.  We will focus on

stabilization and discharge planning.





MMTEDDYL / RAADN: [id number] / Job#: [id number]

## 2022-01-22 NOTE — P.CONS
History of Present Illness





- Reason for Consult


Consult date: 22


Medical management


Requesting physician: Cj Rehman





- Chief Complaint


Depressed





- History of Present Illness





This is a 31-year-old patient who follows Dr. Garcia.  She presented to ER for 

suicidal thoughts.  She stated down that she's been having suicidal thoughts for

some time became more severe to weak ago.  She felt like driving to the traffic 

and decided to call 911.  She also felt like cutting herself with a knife.  She 

does live with her  in a girlfriend.  She drinks about 3 or 4 times a 

week at least 6 whiskey and rum drinks.  Also smokes 2 marijuana joints a day.  

Also smokes close to a pack a day.  In the last 1 year she is put on over 40-50 

pounds.  Sometimes she eats too much or sometimes a little..  Does not sleep 

well.  No fever no chills.





Review of systems:


GEN.:  Weight gain


EYES: None


HEENT: None


NECK: None


RESPIRATORY: Occasional wheezing


CARDIOVASCULAR: None


GASTROINTESTINAL: Reflex


GENITOURINARY: None


MUSCULOSKELETAL: None


LYMPHATICS: None


HEMATOLOGICAL: None  


PSYCHIATRY: Anxious depressed


NEUROLOGICAL: Poor sleep]





Past medical history to include:


OCD, borderline personality disorder, PTSD





Social history:


Smokes about 6 drinks 3-4 times a week that includes whiskey and rum.  Marijuana

about 2 joints a day.  Smokes about 15 cigarettes to a pack a day.  Lives with 

her  and girlfriend and a child.  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





Family history:


Reviewed, noncontributory to presentation





Physical examination:


VITAL SIGNS: 97.1, 114, 20, 122/55, 99% room air


GENERAL: BMI 56.1, sitting up in chair, somewhat depressed appearing.  Tattoos.


EYES: Pupils equal.  Conjunctiva normal.


HEENT: External appearance of nose and ears normal, oral cavity grossly normal.


NECK: JVD not raised; masses not palpable.


HEART: First and second heart sounds are normal;  no edema.  


LUNGS: Respiratory rate normal; mild wheezing.  


ABDOMEN: Soft,  nontender, liver spleen not palpable, no masses palpable.  


PSYCH: Alert and oriented x3;  mood  and affect a bit lowl.  


MUSCULOSKELETAL:No Clubbing/cyanosis;muscles-grossly intact


NEUROLOGICAL: Cranial nerves grossly intact; no facial asymmetry,   power and 

sensation grossly intact. 


LYMPHATICS: No lymph nodes palpable in the axilla and neck





INVESTIGATIONS, reviewed in the clinical context:


UA positive for blood.  Leukoesterase.  RBC more than 22.


Urine hCG: Not detected


Urine drug screen positive for oxycodone, marijuana


Coronavirus [PCR]: Not detected





Assessment and plan:





-Morbid obesity.  BMI 56.1.  Patient was put on over 40 pounds in the last 

several months.  Patient was counseled about her diet.  Will put on 1800-calorie

 diet.  Dietitian to see the patient.





-Chronic nicotine dependence, cigarette smoking


Nicotine 21 mg patch.  Advised against the same-





-Recreational marijuana use


Advised against the same





-Alcohol use disorder.


Watch for withdrawal symptoms.





-GERD


Pepcid 20 mg twice a day





-Acute insomnia, from mental disorders


Patient should do better with antidepressants





-Major depression with acute episode


Patient has been currently prescribed by psychiatry on BuSpar, Lamictal, 

Lipitor, Desyrel.





Patient counseled against alcohol, smoking, marijuana.  Nicotine patch.  Pepcid.

  Consult dietitian.  1800-calorie diet.  Follow-up with TORREY Gant upon discharge.


Thank you Dr. Rehman





Past Medical History


Past Medical History: No Reported History


Additional Past Medical History / Comment(s): OCD, borderline personality 

disorder.


History of Any Multi-Drug Resistant Organisms: None Reported


Past Surgical History:  Section


Additional Past Surgical History / Comment(s): I&D of cyst to buttocks


Past Anesthesia/Blood Transfusion Reactions: No Reported Reaction


Past Psychological History: Anxiety, Bipolar, PTSD


Smoking Status: Current every day smoker


Past Alcohol Use History: Occasional


Past Drug Use History: Marijuana





Medications and Allergies


                                Home Medications











 Medication  Instructions  Recorded  Confirmed  Type


 


Aviane 0.1-0.02 Tablet 1 tab PO HS 10/26/20 01/21/22 History


 


ARIPiprazole [Abilify] 20 mg PO HS 22 History


 


Cyclobenzaprine [Flexeril] 10 mg PO DAILY PRN 22 History


 


Ergocalciferol [Vitamin D2 (1250 1,250 mcg PO WE 22 History





Mcg = 43995 Iu)]    


 


busPIRone HCL 15 mg PO TID 22 History


 


lamoTRIgine [LaMICtal] 200 mg PO HS 22 History


 


traZODone HCL [Desyrel] 100 mg PO HS 22 History








                                    Allergies











Allergy/AdvReac Type Severity Reaction Status Date / Time


 


No Known Allergies Allergy   Verified 22 11:06














Physical Exam


Vitals: 


                                   Vital Signs











  Temp Pulse Resp BP Pulse Ox


 


 22 08:00  97.1 F L  114 H  20  122/55 


 


 22 16:53  97.8 F  101 H  18  127/63  99








                                Intake and Output











 22





 22:59 06:59 14:59


 


Other:   


 


  Weight 125.9 kg  














Results


Labs: 


                  Abnormal Lab Results - Last 24 Hours (Table)











  22 Range/Units





  18:03 


 


Urine Appearance  Cloudy H  (Clear)  


 


Urine Protein  1+ H  (Negative)  


 


Urine Blood  Large H  (Negative)  


 


Ur Leukocyte Esterase  Moderate H  (Negative)  


 


Urine RBC  >182 H  (0-5)  /hpf


 


Urine WBC  33 H  (0-5)  /hpf


 


Ur Squamous Epith Cells  6 H  (0-4)  /hpf


 


Urine Bacteria  Occasional H  (None)  /hpf

## 2022-01-23 LAB
ALBUMIN SERPL-MCNC: 4.1 G/DL (ref 3.5–5)
ALP SERPL-CCNC: 76 U/L (ref 38–126)
ALT SERPL-CCNC: 40 U/L (ref 4–34)
ANION GAP SERPL CALC-SCNC: 7 MMOL/L
AST SERPL-CCNC: 32 U/L (ref 14–36)
BASOPHILS # BLD AUTO: 0 K/UL (ref 0–0.2)
BASOPHILS NFR BLD AUTO: 0 %
BUN SERPL-SCNC: 14 MG/DL (ref 7–17)
CALCIUM SPEC-MCNC: 9.1 MG/DL (ref 8.4–10.2)
CHLORIDE SERPL-SCNC: 108 MMOL/L (ref 98–107)
CHOLEST SERPL-MCNC: 159 MG/DL (ref 0–200)
CO2 SERPL-SCNC: 25 MMOL/L (ref 22–30)
EOSINOPHIL # BLD AUTO: 0.2 K/UL (ref 0–0.7)
EOSINOPHIL NFR BLD AUTO: 3 %
ERYTHROCYTE [DISTWIDTH] IN BLOOD BY AUTOMATED COUNT: 4.86 M/UL (ref 3.8–5.4)
ERYTHROCYTE [DISTWIDTH] IN BLOOD: 13.7 % (ref 11.5–15.5)
GLUCOSE SERPL-MCNC: 101 MG/DL (ref 74–99)
HCT VFR BLD AUTO: 45.6 % (ref 34–46)
HDLC SERPL-MCNC: 51.6 MG/DL (ref 40–60)
HGB BLD-MCNC: 14.8 GM/DL (ref 11.4–16)
LDLC SERPL CALC-MCNC: 85.6 MG/DL (ref 0–131)
LYMPHOCYTES # SPEC AUTO: 1.8 K/UL (ref 1–4.8)
LYMPHOCYTES NFR SPEC AUTO: 28 %
MCH RBC QN AUTO: 30.4 PG (ref 25–35)
MCHC RBC AUTO-ENTMCNC: 32.4 G/DL (ref 31–37)
MCV RBC AUTO: 93.8 FL (ref 80–100)
MONOCYTES # BLD AUTO: 0.5 K/UL (ref 0–1)
MONOCYTES NFR BLD AUTO: 8 %
NEUTROPHILS # BLD AUTO: 3.8 K/UL (ref 1.3–7.7)
NEUTROPHILS NFR BLD AUTO: 60 %
PLATELET # BLD AUTO: 302 K/UL (ref 150–450)
POTASSIUM SERPL-SCNC: 4.3 MMOL/L (ref 3.5–5.1)
PROT SERPL-MCNC: 7.4 G/DL (ref 6.3–8.2)
SODIUM SERPL-SCNC: 140 MMOL/L (ref 137–145)
TRIGL SERPL-MCNC: 109 MG/DL (ref 0–149)
VLDLC SERPL CALC-MCNC: 21.8 MG/DL (ref 5–40)
WBC # BLD AUTO: 6.4 K/UL (ref 3.8–10.6)

## 2022-01-23 RX ADMIN — LURASIDONE HYDROCHLORIDE SCH MG: 80 TABLET, FILM COATED ORAL at 08:12

## 2022-01-23 RX ADMIN — FAMOTIDINE SCH MG: 20 TABLET, FILM COATED ORAL at 08:11

## 2022-01-23 RX ADMIN — IBUPROFEN SCH MG: 400 TABLET, FILM COATED ORAL at 08:12

## 2022-01-23 RX ADMIN — FAMOTIDINE SCH MG: 20 TABLET, FILM COATED ORAL at 20:52

## 2022-01-23 RX ADMIN — NICOTINE SCH PATCH: 21 PATCH, EXTENDED RELEASE TRANSDERMAL at 08:11

## 2022-01-23 RX ADMIN — IBUPROFEN SCH MG: 400 TABLET, FILM COATED ORAL at 20:53

## 2022-01-23 RX ADMIN — IBUPROFEN SCH MG: 400 TABLET, FILM COATED ORAL at 16:31

## 2022-01-23 RX ADMIN — CYCLOBENZAPRINE HYDROCHLORIDE PRN MG: 10 TABLET, FILM COATED ORAL at 06:07

## 2022-01-23 NOTE — PN
PROGRESS NOTE



DATE OF SERVICE:

01/23/2022



CHIEF COMPLAINT:

The patient was depressed and having suicide thoughts.  She said she had to pull her

car over because she was afraid she would drive into traffic.



INTERVAL HISTORY:

The patient has been doing fair.  She had a quiet day yesterday.  She comes out on the

unit.  She will interact with others.  She tends to have a reserved manner.  She notes

that she had difficulty early in the night when she was trying to go to sleep.  She

said she felt lonely and was crying.  She said the sense of loneliness is one of the

biggest struggle she has. At home she said she had both her  and partner who

would support her and be around to keep her from feeling that way.  Ultimately she was

able to get to sleep and said she slept fair, though she woke on and off through the

night. Today she has been up.  She does attend groups and states that that is perhaps

the most supportive thing she experiences here.  She says that she has not had any

problems with the start of Latuda, nor has she noted any difficulties with the

reduction in BuSpar. Overall she tolerates her psychotropic medications.



MENTAL STATUS:

Patient sat with a little restlessness.  She gave fairly good eye contact.  She

answered questions with brief responses.  Her thoughts were clear.  She was spontaneous

and somewhat interactive.  Her affect was anxious and somewhat constricted, her mood

reserved.  She seemed moderately distressed.  There was no indication of thought

disorder.  She voiced no thoughts of harm.  Cognition was clear.



ASSESSMENT:

I will continue the current diagnosis and treatment plan.  I will continue psychotropic

medications the same.  She was started yesterday on Latuda in place of Abilify, which

had not been helping. In addition she has mostly struggled with depression for the last

two months relating to her bipolar disorder. As noted, Latuda has specific indication

for bipolar depression. I reduced BuSpar in an effort to simplify medications, given

that she did not seem to be benefitting from BuSpar. I would look to taper her off

BuSpar altogether.  The patient continues with significant anxiety and mood

difficulties though does show some progress, mainly through supportive groups.  We will

focus on stabilization and discharge planning.





MMODL / IJN: [id number] / Job#: [id number]

## 2022-01-24 RX ADMIN — Medication PRN GM: at 22:07

## 2022-01-24 RX ADMIN — Medication PRN GM: at 20:15

## 2022-01-24 RX ADMIN — IBUPROFEN SCH MG: 400 TABLET, FILM COATED ORAL at 22:08

## 2022-01-24 RX ADMIN — NICOTINE SCH PATCH: 21 PATCH, EXTENDED RELEASE TRANSDERMAL at 10:14

## 2022-01-24 RX ADMIN — FAMOTIDINE SCH MG: 20 TABLET, FILM COATED ORAL at 08:19

## 2022-01-24 RX ADMIN — IBUPROFEN SCH MG: 400 TABLET, FILM COATED ORAL at 14:47

## 2022-01-24 RX ADMIN — CYCLOBENZAPRINE HYDROCHLORIDE PRN MG: 10 TABLET, FILM COATED ORAL at 08:20

## 2022-01-24 RX ADMIN — LURASIDONE HYDROCHLORIDE SCH MG: 80 TABLET, FILM COATED ORAL at 10:14

## 2022-01-24 RX ADMIN — IBUPROFEN SCH MG: 400 TABLET, FILM COATED ORAL at 08:19

## 2022-01-24 RX ADMIN — FAMOTIDINE SCH MG: 20 TABLET, FILM COATED ORAL at 20:54

## 2022-01-24 NOTE — P.PN
Progress Note - Text


Progress Note Date: 01/24/22





Interval History:


Patient was seen attending group and was directable and agreeable to speak with 

writer in the office.  The patient reports that she is feeling better since her 

admission.  She reports that she continues to have intrusive thoughts of wanting

to hurt herself but that Haldol yesterday help slow down her thoughts and 

decreased her suicidality.  She reports that the suicidal thoughts are constant 

and chronic.  She reports that he has been ongoing since her trauma when she was

8 years old.  At this time though, the patient is not endorsing any suicidal 

ideation.  She reports no homicidal ideation, intention, and/or plan.  Denying 

any auditory visual hallucinations.  She has been adherent with medications and 

is not endorsing any significant side effects at this time.  She is open to 

starting Zoloft to address PTSD and anxiety symptoms.  The patient has been 

attending groups with the high-level participation.





Mental Status Exam:


General Appearance: Patient appears to be stated age is alert, directable, and 

cooperative.  Obeses body habitus.  Multiple tattoos.


Behavior: Patient is calmly seated without any agitated behavior.  Eye contact 

is appropriate.


Speech: Patient's speech is fluent and nonpressured. 


Mood/Affect: Mood is improving mildly, affect is congruent and constricted. 


Suicidality/Homicidality:  Patient denies having any suicidal or homicidal 

ideation intent or plan.  


Perceptions: Patient denies any visual hallucinations and denies any auditory 

hallucinations  


Though content/process: There is no evidence of any delusional thought content 

and thought process is linear and goal-directed. 


Memory and concentration: AOX3, grossly intact for the purposes of this session


Judgment and insight: Improving mildly





                                   Vital Signs











Temp  97.4 F L  01/24/22 08:22


 


Pulse  113 H  01/24/22 08:22


 


Resp  14   01/24/22 08:22


 


BP  141/74   01/24/22 08:22


 


Pulse Ox  96   01/24/22 08:22








                                 Intake & Output











 01/23/22 01/24/22 01/24/22





 18:59 06:59 18:59


 


Weight 126.3 kg  














Assessment


Bipolar 2 disorder, depressed episode


PTSD


Borderline personality disorder


Opiate use disorder





Plan:


-Patient continues to meet criteria for inpatient psychiatric admission for 

symptom stabilization and safety. Patient has signed adult voluntary form and 

medication consent and was placed in patient's chart.


-Medications: 


Discontinue BuSpar to decrease polypharmacy


Start Zoloft 50 mg daily for depression/anxiety/PTSD


Continue Lamictal 20 mg daily at bedtime for mood stabilization


Continue to Latuda 80 mg daily for bipolar


Continue trazodone 100 mg by mouth at bedtime for insomnia


-When necessary Haldol for agitation/aggression.


-NRT - nicotine patch


-SW on board for discharge planning.  Encouraged the patient to participate in 

milieu.

## 2022-01-25 VITALS
DIASTOLIC BLOOD PRESSURE: 79 MMHG | TEMPERATURE: 97.7 F | SYSTOLIC BLOOD PRESSURE: 139 MMHG | HEART RATE: 93 BPM | RESPIRATION RATE: 18 BRPM

## 2022-01-25 RX ADMIN — LURASIDONE HYDROCHLORIDE SCH MG: 80 TABLET, FILM COATED ORAL at 08:20

## 2022-01-25 RX ADMIN — NICOTINE SCH PATCH: 21 PATCH, EXTENDED RELEASE TRANSDERMAL at 08:19

## 2022-01-25 RX ADMIN — IBUPROFEN SCH MG: 400 TABLET, FILM COATED ORAL at 08:20

## 2022-01-25 RX ADMIN — Medication PRN GM: at 08:19

## 2022-01-25 RX ADMIN — CYCLOBENZAPRINE HYDROCHLORIDE PRN MG: 10 TABLET, FILM COATED ORAL at 08:21

## 2022-01-25 RX ADMIN — FAMOTIDINE SCH MG: 20 TABLET, FILM COATED ORAL at 08:20

## 2022-01-25 NOTE — P.DS
Providers


Date of admission: 


01/21/22 15:51





Expected date of discharge: 01/25/22


Attending physician: 


Cj Rehman MD





Consults: 





                                        





01/21/22 15:52


Consult Physician Routine 


   Consulting Provider: Neto Kwok


   Consult Reason/Comments: medical management


   Do you want consulting provider notified?: Yes











Primary care physician: 


Jay Garcia








- Discharge Diagnosis(es)


(1) Bipolar 2 disorder, major depressive episode


Current Visit: Yes   Status: Acute   Priority: High   





(2) PTSD (post-traumatic stress disorder)


Current Visit: Yes   Status: Chronic   Priority: Medium   





(3) Opiate abuse, episodic


Current Visit: Yes   Status: Chronic   Priority: Medium   





(4) Borderline personality disorder


Current Visit: Yes   Status: Chronic   Priority: Medium   





(5) Nicotine dependence


Current Visit: Yes   Status: Chronic   Priority: Medium   


Hospital Course: 





Admission HPI:


Initial psychiatric evaluation was completed by Dr Castro on 01/22/2022 who 

wrote:


"The patient is a 31-year-old female. She lives with her  and significant

other. She presented to the ED for evaluation of depression and suicidal 

thoughts.She said she had to pull her car over because she was afraid she would 

drive into traffic. 


Patient has had long-term problems with depression.  She said that she has been 

previously diagnosed bipolar disorder, borderline personality disorder, OCD, and

PTSD.  She has that she has had episodes of mood swings.  She will get manic 

episodes when she has high energy.  She may go on spending sprees.  She'll have 

decreased need for sleep.  She will have racing thoughts.  She says the episodes

last round week and then will drop into depression.  She says that she has 

barely any middle ground.  She notes that in the last 2 months, she has been 

primarily been in depression and has not seen any manic symptoms.  Does note 

that she has had 1 prior psychiatric hospitalization at this facility on 

10/27/2020.  At that time, she was diagnosed major depression.  Can medications 

that she has been on include Lamictal, BuSpar, Abilify, and trazodone.  She said

the trazodone is recent addition, though she has been on the other medications 

for quite a period of time.  She is healthy [facility].  She also notes that in the 

past she has been on Pristiq, which she took for about 4 months, though has st

opped it because she was not getting any benefits from the medication.  She 

notes that she has been sleeping poorly, loss of motivation, and loss of 

interest.  She says she has persistent thoughts of suicide and just more 

recently had intense suicidal thoughts without any trigger.  She says in the 

past, some stress issue or another might set this off, though now things have 

worsened to the point where these thoughts just come on to her.  She notes that 

she does have what she describes as "occasional hallucinations" where she might 

hear her name or some noises, though it is nothing that is very persistent.  She

says that she does not get any command hallucinations.  She has had some visual 

perception issues of lights and shadows.  She notes that there are times that he

gets delusional thinking on her depression gets bad.  She notes that she has OCD

symptoms which are mainly intrusive thoughts I can go over and her head 

repeatedly.  She notes that she'll have a few compulsive behaviors, such as 

going through certain rituals are doing certain things 3 times repeatedly.  She 

describes dramatic issues from her past, though she did not go into detail.  She

has chronic back pain and notes that the pain and depression seem to aggravate 

one another.  She is admitted for further evaluation.  





Hospital course:


Upon admission to the unit patient was initially patient was endorsing signifi

cant suicidal thoughts and presenting with a distressed affect. Patient was 

however directable and agreeable to commence treatment. Patient got along well 

with other patients on the unit and followed unit protocol.  Patient was 

compliant with the medications and denied any side effects throughout hospital 

course.  Patient was started on Lamictal, Latuda, and trazodone.  Her home 

medication of Abilify was discontinued and the patient's BuSpar was tapered.  

Patient spoke of her stressors and engaged in therapy both group and individual.

 Patient was also seen by medical team for history and physical exam.  We'll 

request the hospitalization, her BuSpar was tapered off.  The patient endorsed 

significant PTSD symptoms and was agreeable to starting Zoloft for management of

depression/anxiety/PTSD.


Throughout the course of the hospitalization patient gradually improved with 

regards to her mood, sleep and became future oriented with improved insight and 

judgment. On the day of discharge patient denied any suicidal or homicidal 

ideations intent or plan denied any auditory or visual hallucinations. Patient 

endorsed wanting to live for her health and family.  The patient denied any 

access to guns or weapons.  Patient denied any paranoia and did not endorse any 

delusions.  Patient does have a significant history of substance abuse however 

was counseled on abstaining from all substances including alcohol and marijuana.

Patient was offered however declined inpatient substance-abuse rehab. Patient 

was also counseled on the medications and need for regular compliance and was 

encouraged to follow-up with their outpatient appointment for mental health and 

also for primary care.  Prior to discharge a family meeting will be arranged by 

 to answer any questions and ensure safety upon discharge.





Mental status exam:


General Appearance: Patient appears to be stated age is alert, pleasant, and 

cooperative. Patient is in no acute distress and has fair hygiene and grooming. 

Obese body habitus, multiple tattoos and multiple piercings. 


Behavior: Patient is calmly seated without any agitated behavior.  Eye contact 

is appropriate.


Speech: Patient's speech is fluent and nonpressured. 


Mood/Affect: Patient reports their mood is "feeling pretty good", affect is 

congruent and euthymic to bright. 


Suicidality/Homicidality:  Patient denies having any suicidal or homicidal 

ideation intent or plan.  


Perceptions: Patient denies any auditory or visual hallucinations.  


Though content/process: There is no evidence of any delusional thought content 

and thought process is linear and goal-directed.  The patient is future 

oriented.


Memory and concentration: AOX3, grossly intact for the purposes of this session.

Can spell "WORLD" backwards correctly.


Judgment and insight: Improved with guarded prognosis





Impression:


Bipolar 2 disorder, depressed episode


Nicotine dependence


PTSD


Borderline personality disorder


Opiate use disorder





Plan:


-Continue with discharge today as patient has improved and stabilized 

psychiatrically and is not currently an imminent threat to herself and/or 

others.  Patient will remain at chronically elevated risk due to her impulsivity

and substance use.


-Continue medications: 


Trazodone 100 mg daily at bedtime for insomnia


Zoloft milligrams by mouth daily for depression/anxiety/PTSD


Lamictal 200 mg daily at bedtime for bipolar depression


Latuda 80 mg by mouth daily for bipolar depression


-Patient was counseled on the need for medication compliance and appropriate 

follow-up at mental health and also primary care for medical issues.  Patient ve

rbalized understanding and agreed.


-Social work to arrange for and conduct family meeting to ensure safety upon 

discharge and answer any questions/concerns. Social work also to arrange for 

patients follow up appointments with [facility] for psychiatric care along with follow 

up with primary care provider.


-Patient counseled on abstaining from recreational drugs and marijuana and 

alcohol. Was informed/educated on the adverse effects on their physical and 

mental health. Patient verbally agreed and understood. Patient was offered 

substance abuse treatment however declined at this time.


-Patient was instructed to return to the hospital or seek immediate medical care

if their psychiatric or medical symptoms do worsen or reoccur.


-Psychoeducation and supportive therapy provided to patient.  Risks and benefits

of pharmacological treatment versus the risks and benefits of nontreatment 

weight and discussed.  Informed consent discussion held.  Common side effects of

psychotropics discussed such as, but not limited to headache, GI disturbance, 

sexual dysfunction, movement disorders, sedation, and orthostatic hypotension.  

Life threatening and blackbox warnings of prescribed medications also discussed.

 Potential risks of operating a vehicle or heavy machinery discussed with 

patient at length.  Advised on importance of compliance and a reliable and 

responsible manner. Patient advised to review FDA consumer labeling of all 

medications prior to taking.  Patient verbalized understanding of potential 

risks, and agrees with current treatment plan.  Patient advised to medically 

contact physician/emergency personnel if any acute changes in condition occur.





Plan - Discharge Summary


Discharge Rx Participant: No


New Discharge Prescriptions: 


New


   traZODone HCL [Desyrel] 100 mg PO HS 30 Days  tab


   Sertraline [Zoloft] 50 mg PO DAILY 30 Days  tab


   Nicotine 21Mg/24Hr Patch [Habitrol] 1 patch TRANSDERM DAILY 30 Days  patch


   lamoTRIgine [LaMICtal] 200 mg PO HS 30 Days  tab


   Lurasidone [Latuda] 80 mg PO DAILY 30 Days  tab





Continue


   Aviane 0.1-0.02 Tablet 1 tab PO HS


   Ergocalciferol [Vitamin D2 (1250 Mcg = 88933 Iu)] 1,250 mcg PO WE


   Cyclobenzaprine [Flexeril] 10 mg PO DAILY PRN


     PRN Reason: Muscle Spasm





Discontinued


   traZODone HCL [Desyrel] 100 mg PO HS


   lamoTRIgine [LaMICtal] 200 mg PO HS


   busPIRone HCL 15 mg PO TID


   ARIPiprazole [Abilify] 20 mg PO HS


Discharge Medication List





Aviane 0.1-0.02 Tablet 1 tab PO HS 10/26/20 [History]


Cyclobenzaprine [Flexeril] 10 mg PO DAILY PRN 01/21/22 [History]


Ergocalciferol [Vitamin D2 (1250 Mcg = 76829 Iu)] 1,250 mcg PO WE 01/21/22 

[History]


Lurasidone [Latuda] 80 mg PO DAILY 30 Days  tab 01/25/22 [Rx]


Nicotine 21Mg/24Hr Patch [Habitrol] 1 patch TRANSDERM DAILY 30 Days  patch 

01/25/22 [Rx]


Sertraline [Zoloft] 50 mg PO DAILY 30 Days  tab 01/25/22 [Rx]


lamoTRIgine [LaMICtal] 200 mg PO HS 30 Days  tab 01/25/22 [Rx]


traZODone HCL [Desyrel] 100 mg PO HS 30 Days  tab 01/25/22 [Rx]








Follow up Appointment(s)/Referral(s): 


[facility] [Outside] - 02/01/22 11:00 am


(Dr Telles 2/10/22 at 2


2/1/22 at 11 with Colby hale Chataignier )


Jay Garcia MD [Primary Care Provider] - 1-2 days


Patient Instructions/Handouts:  Bipolar Disorder (DC), Post Traumatic Stress 

Disorder (DC), Obsessive Compulsive Disorder (DC)


Activity/Diet/Wound Care/Special Instructions: 


Activity and diet as tolerated. Avoid the use of street drugs and alcohol. Take 

all medications as prescribed. When you are in need of refills on your 

medications please contact your medical provider and/or outpatient psychiatrist 

to have this done. Please go to scheduled outpatient appointment for aftercare 

treatment. If symptoms return or become worse, call the crisis line at 

[phone] and/or go to the nearest emergency room for evaluation





Recommended that patient follows up with a sleep study for sleep apnea. 


Discharge Disposition: HOME SELF-CARE

## 2022-04-27 ENCOUNTER — HOSPITAL ENCOUNTER (OUTPATIENT)
Dept: HOSPITAL 47 - SLEEP | Age: 32
End: 2022-04-27
Attending: INTERNAL MEDICINE
Payer: COMMERCIAL

## 2022-04-27 DIAGNOSIS — F43.10: ICD-10-CM

## 2022-04-27 DIAGNOSIS — G25.81: ICD-10-CM

## 2022-04-27 DIAGNOSIS — G47.33: Primary | ICD-10-CM

## 2022-04-27 DIAGNOSIS — F17.210: ICD-10-CM

## 2022-04-27 DIAGNOSIS — F31.9: ICD-10-CM

## 2022-04-27 DIAGNOSIS — F42.9: ICD-10-CM

## 2022-04-27 DIAGNOSIS — E66.9: ICD-10-CM

## 2022-04-27 PROCEDURE — 99211 OFF/OP EST MAY X REQ PHY/QHP: CPT

## 2022-04-27 NOTE — CONS
CONSULTATION



DATE OF SERVICE:

2022



This 31-year-old lady has been evaluated in [facility] for possible obstructive sleep

apnea-hypopnea syndrome and significant excessive daytime sleepiness.



HISTORY OF PRESENT ILLNESS/SLEEP-WAKE EVALUATION:

Patient's usual sleep schedule is from 9 p.m. to 7:30 a.m. on weekdays and from 10 p.m.

to 8 a.m. on weekends. At present she has no problems with falling asleep.  She is

taking medication and it helps her fall asleep. During sleep she has extremely loud

snoring and witnessed episodes of stopped breathing. During sleep she grinds her teeth,

has restless leg symptoms, gasping for air, snoring.



The patient wakes up from sleep up to 4 times, with 3 episodes of nocturia.



In the morning the patient wakes up tired, has difficulties paying attention, falling

asleep during the day.  She has problems with memory, concentration, depression, sexual

dysfunction.  [place] Sleepiness Scale is significantly increased at 16.



No history of hypnagogic hallucination, sleep paralysis or cataplexy.



PAST MEDICAL HISTORY:

Positive for bipolar disorder, obsessive-compulsive disorder, post-traumatic stress

disorder.



PAST SURGICAL HISTORY:

 in .



MEDICATIONS:

1. Latuda 40 mg once a day.

2. Zoloft 50 mg once a day.

3. Trazodone 200 mg at bedtime.

4. Ibuprofen 400 mg once a day.

5. Flexeril 10 mg once a day.

6. Lamictal 200 mg once a day.

7. Vitamin D supplement.

8. _____ with relationship to the cycle.



SOCIAL HISTORY:

Positive for smoking about one pack a day since age of 14.  Alcohol consumption: Last

use 3 months ago.



REVIEW OF SYSTEMS:

Snoring, awakenings from sleep, twitching of her legs during the night, sleepiness

during the day.



No fevers.  No double vision.  No recent chest pain.  No shortness of breath.  No

abdominal pain.  No bleeding episodes.  No blood in the urine.  No seizure episodes.



PHYSICAL EXAMINATION:

GENERAL: Pleasant  lady without distress.

VITAL SIGNS: /79, HR 97, RR 18, height 5 feet 0 inches, weight 283.4, body mass

index 55.2, temperature 97.4, oxygen saturation at room air 95%.

HEENT: PERRLA, EOMI, evaluation of oropharynx showed tongue protrudes midline.

Extremely low position of soft palate; Mallampati IV.

NECK:  Supple, no JVD.  Thyroid is not palpable. Neck measures 17-1/2 inches in

circumference.

LUNGS:  Clear to percussion and to auscultation.  Good air exchange.  No wheezing or

rhonchi.

HEART:  S1, S2 regular.  No murmurs, gallops, or rubs.

ABDOMEN:  Obese.

EXTREMITIES: No clubbing or cyanosis.

CNS:  Awake, alert, and oriented X3.  Cranial nerves 2 to 7 intact.  There is no

fasciculation or atrophy. noted.  No focal deficits observed.



IMPRESSION:

1. Snoring, witnessed episodes of stopped breathing during sleep, multiple awakenings

    from sleep with nocturia, extremely low position of soft palate, Mallampati IV,

    wide neck, 17-1/2 inches in circumference, sleepiness, [place] Sleepiness Scale

    significantly increased at 16; obstructive sleep apnea-hypopnea syndrome.

2. Obesity; body mass index 55.2.

3. History of twitching of legs; possible periodic limb movements.

4. History of restless leg symptoms.

5. History of bipolar disorder.

6. History of post-traumatic stress disorder.

7. History of obsessive-compulsive disorder.

8. Status post .



PLAN:

1. Polysomnography for evaluation of patient's breathing during sleep and significant

    excessive daytime sleepiness; [place] Sleepiness Scale increased to 16.

2. CPAP/BiPAP titration if sleep study confirms obstructive sleep apnea-hypopnea

    syndrome.

3. Preferable position during sleep on the side.

4. No driving if patient feels any sleepiness.

5. I will see patient for follow up visit to explain results of testing and following

    plan.



Thank you very much for referring this patient for consultation.



Sincerely,







Martínez Bajwa MD, PhD, FAASM

Diplomat of [employer] Medical Specialties

Sleep Medicine Board of American [employer] of Internal Medicine

Medical Director of [facility]





MMODL / RAADN: [id number] / Job#: [id number]

## 2022-12-08 ENCOUNTER — HOSPITAL ENCOUNTER (EMERGENCY)
Dept: HOSPITAL 47 - EC | Age: 32
LOS: 1 days | Discharge: PSYCHIATRIC OR SUBSTANCE USE FACILITY | End: 2022-12-09
Payer: COMMERCIAL

## 2022-12-08 VITALS
RESPIRATION RATE: 20 BRPM | SYSTOLIC BLOOD PRESSURE: 147 MMHG | DIASTOLIC BLOOD PRESSURE: 74 MMHG | TEMPERATURE: 98 F | HEART RATE: 100 BPM

## 2022-12-08 DIAGNOSIS — F17.200: ICD-10-CM

## 2022-12-08 DIAGNOSIS — F32.A: Primary | ICD-10-CM

## 2022-12-08 DIAGNOSIS — F41.9: ICD-10-CM

## 2022-12-08 DIAGNOSIS — F12.90: ICD-10-CM

## 2022-12-08 DIAGNOSIS — Z20.822: ICD-10-CM

## 2022-12-08 PROCEDURE — 81025 URINE PREGNANCY TEST: CPT

## 2022-12-08 PROCEDURE — 82075 ASSAY OF BREATH ETHANOL: CPT

## 2022-12-08 PROCEDURE — 99285 EMERGENCY DEPT VISIT HI MDM: CPT

## 2022-12-08 PROCEDURE — 85025 COMPLETE CBC W/AUTO DIFF WBC: CPT

## 2022-12-08 PROCEDURE — 87635 SARS-COV-2 COVID-19 AMP PRB: CPT

## 2022-12-08 PROCEDURE — 80053 COMPREHEN METABOLIC PANEL: CPT

## 2022-12-08 PROCEDURE — 81001 URINALYSIS AUTO W/SCOPE: CPT

## 2022-12-08 PROCEDURE — 80306 DRUG TEST PRSMV INSTRMNT: CPT

## 2022-12-08 PROCEDURE — 36415 COLL VENOUS BLD VENIPUNCTURE: CPT

## 2022-12-09 LAB
ALBUMIN SERPL-MCNC: 4.2 G/DL (ref 3.5–5)
ALP SERPL-CCNC: 81 U/L (ref 38–126)
ALT SERPL-CCNC: 21 U/L (ref 4–34)
ANION GAP SERPL CALC-SCNC: 8 MMOL/L
AST SERPL-CCNC: 26 U/L (ref 14–36)
BASOPHILS # BLD AUTO: 0 K/UL (ref 0–0.2)
BASOPHILS NFR BLD AUTO: 0 %
BUN SERPL-SCNC: 13 MG/DL (ref 7–17)
CALCIUM SPEC-MCNC: 8.9 MG/DL (ref 8.4–10.2)
CHLORIDE SERPL-SCNC: 104 MMOL/L (ref 98–107)
CO2 SERPL-SCNC: 28 MMOL/L (ref 22–30)
EOSINOPHIL # BLD AUTO: 0.2 K/UL (ref 0–0.7)
EOSINOPHIL NFR BLD AUTO: 2 %
ERYTHROCYTE [DISTWIDTH] IN BLOOD BY AUTOMATED COUNT: 4.89 M/UL (ref 3.8–5.4)
ERYTHROCYTE [DISTWIDTH] IN BLOOD: 13.8 % (ref 11.5–15.5)
GLUCOSE SERPL-MCNC: 87 MG/DL (ref 74–99)
HCT VFR BLD AUTO: 42.2 % (ref 34–46)
HGB BLD-MCNC: 14.5 GM/DL (ref 11.4–16)
HYALINE CASTS UR QL AUTO: 1 /LPF (ref 0–2)
LYMPHOCYTES # SPEC AUTO: 2.2 K/UL (ref 1–4.8)
LYMPHOCYTES NFR SPEC AUTO: 29 %
MCH RBC QN AUTO: 29.6 PG (ref 25–35)
MCHC RBC AUTO-ENTMCNC: 34.2 G/DL (ref 31–37)
MCV RBC AUTO: 86.5 FL (ref 80–100)
MONOCYTES # BLD AUTO: 0.5 K/UL (ref 0–1)
MONOCYTES NFR BLD AUTO: 7 %
NEUTROPHILS # BLD AUTO: 4.7 K/UL (ref 1.3–7.7)
NEUTROPHILS NFR BLD AUTO: 61 %
PH UR: 6.5 [PH] (ref 5–8)
PLATELET # BLD AUTO: 263 K/UL (ref 150–450)
POTASSIUM SERPL-SCNC: 3.9 MMOL/L (ref 3.5–5.1)
PROT SERPL-MCNC: 7.3 G/DL (ref 6.3–8.2)
RBC UR QL: 1 /HPF (ref 0–5)
SODIUM SERPL-SCNC: 140 MMOL/L (ref 137–145)
SP GR UR: 1.03 (ref 1–1.03)
SQUAMOUS UR QL AUTO: 18 /HPF (ref 0–4)
UROBILINOGEN UR QL STRIP: <2 MG/DL (ref ?–2)
WBC # BLD AUTO: 7.7 K/UL (ref 3.8–10.6)
WBC #/AREA URNS HPF: 15 /HPF (ref 0–5)

## 2022-12-09 NOTE — ED
Psych HPI





- General


Chief Complaint: Psychiatric Symptoms


Stated Complaint: mental health


Time Seen by Provider: 22 21:30


Source: patient


Mode of arrival: ambulatory





- History of Present Illness


Initial Comments: 





32-year-old female past medical history of OCD, borderline personality disorder,

anxiety and depression who presents emergency Department with auditory hallucina

tions.  States that she follows with [facility].  2 weeks ago they recently changed her

medications.  She was taken off hydroxyzine and trazodone.  She was placed on 

Seroquel.  States that since this change was initiated that she now has 

significant anxiety.  Reports that she can't leave her house as she is afraid 

that she is going to die.  States that she is also concerned because she feels 

like she is going to hurt herself.  She has been taking her medications as 

directed.  Admits to marijuana use and will occasionally take an oxycodone which

is not prescribed to her.  Denies any attempt at harming herself but does have 

thoughts of driving off the road.  Denies concern for pregnancy as she is ase

xual.  Denies homicidal ideations.  No other alleviating, precipitating or 

modifying factors





- Related Data


                                Home Medications











 Medication  Instructions  Recorded  Confirmed


 


Aviane 0.1-0.02 Tablet 1 tab PO HS 10/26/20 05/11/22


 


Cyclobenzaprine [Flexeril] 10 mg PO DAILY PRN 22


 


Ergocalciferol [Vitamin D2 (1250 1,250 mcg PO WE 22





Mcg = 27089 Iu)]   


 


Lurasidone [Latuda] 40 mg PO DAILY 22


 


traZODone HCL [Desyrel] 200 mg PO HS 22








                                  Previous Rx's











 Medication  Instructions  Recorded


 


Ibuprofen [Motrin] 400 mg PO Q8HR PRN 7 Days  tab 22


 


Sertraline [Zoloft] 50 mg PO DAILY 30 Days  tab 22


 


lamoTRIgine [LaMICtal] 200 mg PO HS 30 Days  tab 22











                                    Allergies











Allergy/AdvReac Type Severity Reaction Status Date / Time


 


No Known Allergies Allergy   Verified 22 13:00














Review of Systems


ROS Statement: 


Those systems with pertinent positive or pertinent negative responses have been 

documented in the HPI.





ROS Other: All systems not noted in ROS Statement are negative.





Past Medical History


Past Medical History: No Reported History


Additional Past Medical History / Comment(s): OCD, borderline personality 

disorder.


History of Any Multi-Drug Resistant Organisms: None Reported


Past Surgical History:  Section


Additional Past Surgical History / Comment(s): I&D of cyst to buttocks


Past Anesthesia/Blood Transfusion Reactions: No Reported Reaction


Past Psychological History: Anxiety, Bipolar, PTSD


Smoking Status: Current every day smoker


Past Alcohol Use History: Occasional


Past Drug Use History: Marijuana





General Exam


Limitations: no limitations


General appearance: alert, anxious


Head exam: Present: atraumatic, normocephalic, normal inspection


Eye exam: Present: normal appearance, PERRL, EOMI.  Absent: scleral icterus, 

conjunctival injection, periorbital swelling


ENT exam: Present: normal exam, mucous membranes moist


Neck exam: Present: normal inspection.  Absent: tenderness, meningismus, 

lymphadenopathy


Respiratory exam: Present: normal lung sounds bilaterally.  Absent: respiratory 

distress, wheezes, rales, rhonchi, stridor


Cardiovascular Exam: Present: regular rate, normal rhythm, normal heart sounds. 

 Absent: systolic murmur, diastolic murmur, rubs, gallop, clicks


GI/Abdominal exam: Present: soft, normal bowel sounds.  Absent: distended, 

tenderness, guarding, rebound, rigid


Extremities exam: Present: normal inspection, full ROM, normal capillary refill.

  Absent: tenderness, pedal edema, joint swelling, calf tenderness


Back exam: Present: normal inspection


Neurological exam: Present: alert, oriented X3, CN II-XII intact


Psychiatric exam: Present: depressed, anxious, suicidal ideation


Skin exam: Present: warm, dry, intact, normal color.  Absent: rash





Course


                                   Vital Signs











  22





  18:16


 


Temperature 98 F


 


Pulse Rate 100


 


Respiratory 20





Rate 


 


Blood Pressure 147/74


 


O2 Sat by Pulse 99





Oximetry 














Medical Decision Making





- Medical Decision Making





Upon arrival the patient was placed into room 14.  A thorough history and 

physical exam was performed.  She does provide a urine sample which is positive 

for oxycodone, TCAs and marijuana.  She is evaluated by EPS as she is sober.  

They do feel that the patient needs to be admitted for mental health evaluation.

  Laboratory studies were obtained as the patient needs to be transferred.  

Clinical certification is filled out by myself.  She is currently awaiting 

transfer in stable condition





- Lab Data


Result diagrams: 


                                 22 23:55





                                 22 23:55


                                   Lab Results











  22 Range/Units





  21:58 21:58 21:58 


 


WBC     (3.8-10.6)  k/uL


 


RBC     (3.80-5.40)  m/uL


 


Hgb     (11.4-16.0)  gm/dL


 


Hct     (34.0-46.0)  %


 


MCV     (80.0-100.0)  fL


 


MCH     (25.0-35.0)  pg


 


MCHC     (31.0-37.0)  g/dL


 


RDW     (11.5-15.5)  %


 


Plt Count     (150-450)  k/uL


 


MPV     


 


Neutrophils %     %


 


Lymphocytes %     %


 


Monocytes %     %


 


Eosinophils %     %


 


Basophils %     %


 


Neutrophils #     (1.3-7.7)  k/uL


 


Lymphocytes #     (1.0-4.8)  k/uL


 


Monocytes #     (0-1.0)  k/uL


 


Eosinophils #     (0-0.7)  k/uL


 


Basophils #     (0-0.2)  k/uL


 


Sodium     (137-145)  mmol/L


 


Potassium     (3.5-5.1)  mmol/L


 


Chloride     ()  mmol/L


 


Carbon Dioxide     (22-30)  mmol/L


 


Anion Gap     mmol/L


 


BUN     (7-17)  mg/dL


 


Creatinine     (0.52-1.04)  mg/dL


 


Est GFR (CKD-EPI)AfAm     (>60 ml/min/1.73 sqM)  


 


Est GFR (CKD-EPI)NonAf     (>60 ml/min/1.73 sqM)  


 


Glucose     (74-99)  mg/dL


 


Calcium     (8.4-10.2)  mg/dL


 


Total Bilirubin     (0.2-1.3)  mg/dL


 


AST     (14-36)  U/L


 


ALT     (4-34)  U/L


 


Alkaline Phosphatase     ()  U/L


 


Total Protein     (6.3-8.2)  g/dL


 


Albumin     (3.5-5.0)  g/dL


 


Urine Color    Yellow  


 


Urine Appearance    Cloudy H  (Clear)  


 


Urine pH    6.5  (5.0-8.0)  


 


Ur Specific Gravity    1.031  (1.001-1.035)  


 


Urine Protein    Trace H  (Negative)  


 


Urine Glucose (UA)    Negative  (Negative)  


 


Urine Ketones    Negative  (Negative)  


 


Urine Blood    Negative  (Negative)  


 


Urine Nitrite    Negative  (Negative)  


 


Urine Bilirubin    Negative  (Negative)  


 


Urine Urobilinogen    <2.0  (<2.0)  mg/dL


 


Ur Leukocyte Esterase    Moderate H  (Negative)  


 


Urine RBC    1  (0-5)  /hpf


 


Urine WBC    15 H  (0-5)  /hpf


 


Ur Squamous Epith Cells    18 H  (0-4)  /hpf


 


Calcium Oxalate Crystal    Moderate H  (None)  /hpf


 


Urine Bacteria    Rare H  (None)  /hpf


 


Hyaline Casts    1  (0-2)  /lpf


 


Urine Mucus    Rare H  (None)  /hpf


 


Urine HCG, Qual  Not Detected    (Not Detectd)  


 


Urine Opiates Screen   Not Detected   (NotDetected)  


 


Ur Oxycodone Screen   Detected H   (NotDetected)  


 


Urine Methadone Screen   Not Detected   (NotDetected)  


 


Ur Propoxyphene Screen   Not Detected   (NotDetected)  


 


Ur Barbiturates Screen   Not Detected   (NotDetected)  


 


U Tricyclic Antidepress   Detected H   (NotDetected)  


 


Ur Phencyclidine Scrn   Not Detected   (NotDetected)  


 


Ur Amphetamines Screen   Not Detected   (NotDetected)  


 


U Methamphetamines Scrn   Not Detected   (NotDetected)  


 


U Benzodiazepines Scrn   Not Detected   (NotDetected)  


 


Urine Cocaine Screen   Not Detected   (NotDetected)  


 


U Marijuana (THC) Screen   Detected H   (NotDetected)  


 


Coronavirus (PCR)     (Not Detectd)  














  22 Range/Units





  23:55 23:55 23:55 


 


WBC  7.7    (3.8-10.6)  k/uL


 


RBC  4.89    (3.80-5.40)  m/uL


 


Hgb  14.5    (11.4-16.0)  gm/dL


 


Hct  42.2    (34.0-46.0)  %


 


MCV  86.5    (80.0-100.0)  fL


 


MCH  29.6    (25.0-35.0)  pg


 


MCHC  34.2    (31.0-37.0)  g/dL


 


RDW  13.8    (11.5-15.5)  %


 


Plt Count  263    (150-450)  k/uL


 


MPV  6.9    


 


Neutrophils %  61    %


 


Lymphocytes %  29    %


 


Monocytes %  7    %


 


Eosinophils %  2    %


 


Basophils %  0    %


 


Neutrophils #  4.7    (1.3-7.7)  k/uL


 


Lymphocytes #  2.2    (1.0-4.8)  k/uL


 


Monocytes #  0.5    (0-1.0)  k/uL


 


Eosinophils #  0.2    (0-0.7)  k/uL


 


Basophils #  0.0    (0-0.2)  k/uL


 


Sodium   140   (137-145)  mmol/L


 


Potassium   3.9   (3.5-5.1)  mmol/L


 


Chloride   104   ()  mmol/L


 


Carbon Dioxide   28   (22-30)  mmol/L


 


Anion Gap   8   mmol/L


 


BUN   13   (7-17)  mg/dL


 


Creatinine   0.84   (0.52-1.04)  mg/dL


 


Est GFR (CKD-EPI)AfAm   >90   (>60 ml/min/1.73 sqM)  


 


Est GFR (CKD-EPI)NonAf   >90   (>60 ml/min/1.73 sqM)  


 


Glucose   87   (74-99)  mg/dL


 


Calcium   8.9   (8.4-10.2)  mg/dL


 


Total Bilirubin   0.5   (0.2-1.3)  mg/dL


 


AST   26   (14-36)  U/L


 


ALT   21   (4-34)  U/L


 


Alkaline Phosphatase   81   ()  U/L


 


Total Protein   7.3   (6.3-8.2)  g/dL


 


Albumin   4.2   (3.5-5.0)  g/dL


 


Urine Color     


 


Urine Appearance     (Clear)  


 


Urine pH     (5.0-8.0)  


 


Ur Specific Gravity     (1.001-1.035)  


 


Urine Protein     (Negative)  


 


Urine Glucose (UA)     (Negative)  


 


Urine Ketones     (Negative)  


 


Urine Blood     (Negative)  


 


Urine Nitrite     (Negative)  


 


Urine Bilirubin     (Negative)  


 


Urine Urobilinogen     (<2.0)  mg/dL


 


Ur Leukocyte Esterase     (Negative)  


 


Urine RBC     (0-5)  /hpf


 


Urine WBC     (0-5)  /hpf


 


Ur Squamous Epith Cells     (0-4)  /hpf


 


Calcium Oxalate Crystal     (None)  /hpf


 


Urine Bacteria     (None)  /hpf


 


Hyaline Casts     (0-2)  /lpf


 


Urine Mucus     (None)  /hpf


 


Urine HCG, Qual     (Not Detectd)  


 


Urine Opiates Screen     (NotDetected)  


 


Ur Oxycodone Screen     (NotDetected)  


 


Urine Methadone Screen     (NotDetected)  


 


Ur Propoxyphene Screen     (NotDetected)  


 


Ur Barbiturates Screen     (NotDetected)  


 


U Tricyclic Antidepress     (NotDetected)  


 


Ur Phencyclidine Scrn     (NotDetected)  


 


Ur Amphetamines Screen     (NotDetected)  


 


U Methamphetamines Scrn     (NotDetected)  


 


U Benzodiazepines Scrn     (NotDetected)  


 


Urine Cocaine Screen     (NotDetected)  


 


U Marijuana (THC) Screen     (NotDetected)  


 


Coronavirus (PCR)    Not Detected  (Not Detectd)  














Disposition


Clinical Impression: 


 Depression





Disposition: TRANSFER TO PSYCH HOSP/UNIT


Condition: Stable


Is patient prescribed a controlled substance at d/c from ED?: No


Referrals: 


Jay Garcia MD [Primary Care Provider] - 1-2 days





- Out of Hospital Transfer - Req. Specs


Out of Hospital Transfer - Requested Specifics: Psychiatric Non-ICU ([place])

## 2023-06-22 ENCOUNTER — HOSPITAL ENCOUNTER (INPATIENT)
Dept: HOSPITAL 47 - EC | Age: 33
LOS: 4 days | Discharge: HOME | DRG: 885 | End: 2023-06-26
Attending: PSYCHIATRY & NEUROLOGY | Admitting: PSYCHIATRY & NEUROLOGY
Payer: COMMERCIAL

## 2023-06-22 DIAGNOSIS — Z71.6: ICD-10-CM

## 2023-06-22 DIAGNOSIS — Z79.3: ICD-10-CM

## 2023-06-22 DIAGNOSIS — F60.3: ICD-10-CM

## 2023-06-22 DIAGNOSIS — F33.2: Primary | ICD-10-CM

## 2023-06-22 DIAGNOSIS — F17.210: ICD-10-CM

## 2023-06-22 DIAGNOSIS — Z62.810: ICD-10-CM

## 2023-06-22 DIAGNOSIS — Z20.822: ICD-10-CM

## 2023-06-22 DIAGNOSIS — Z71.41: ICD-10-CM

## 2023-06-22 DIAGNOSIS — Z81.1: ICD-10-CM

## 2023-06-22 DIAGNOSIS — M79.7: ICD-10-CM

## 2023-06-22 DIAGNOSIS — Z79.899: ICD-10-CM

## 2023-06-22 DIAGNOSIS — F42.9: ICD-10-CM

## 2023-06-22 DIAGNOSIS — Z71.51: ICD-10-CM

## 2023-06-22 DIAGNOSIS — Z91.51: ICD-10-CM

## 2023-06-22 DIAGNOSIS — Z28.311: ICD-10-CM

## 2023-06-22 DIAGNOSIS — F43.10: ICD-10-CM

## 2023-06-22 DIAGNOSIS — F12.10: ICD-10-CM

## 2023-06-22 DIAGNOSIS — Z91.410: ICD-10-CM

## 2023-06-22 DIAGNOSIS — Z81.8: ICD-10-CM

## 2023-06-22 PROCEDURE — 80306 DRUG TEST PRSMV INSTRMNT: CPT

## 2023-06-22 PROCEDURE — 85025 COMPLETE CBC W/AUTO DIFF WBC: CPT

## 2023-06-22 PROCEDURE — 84443 ASSAY THYROID STIM HORMONE: CPT

## 2023-06-22 PROCEDURE — 80175 DRUG SCREEN QUAN LAMOTRIGINE: CPT

## 2023-06-22 PROCEDURE — 81025 URINE PREGNANCY TEST: CPT

## 2023-06-22 PROCEDURE — 83036 HEMOGLOBIN GLYCOSYLATED A1C: CPT

## 2023-06-22 PROCEDURE — 80053 COMPREHEN METABOLIC PANEL: CPT

## 2023-06-22 PROCEDURE — 81001 URINALYSIS AUTO W/SCOPE: CPT

## 2023-06-22 PROCEDURE — 87636 SARSCOV2 & INF A&B AMP PRB: CPT

## 2023-06-22 PROCEDURE — 82075 ASSAY OF BREATH ETHANOL: CPT

## 2023-06-22 PROCEDURE — 99285 EMERGENCY DEPT VISIT HI MDM: CPT

## 2023-06-22 NOTE — ED
General Adult HPI





- General


Chief complaint: Psychiatric Symptoms


Stated complaint: Mental Health Suicidal


Time Seen by Provider: 23 17:12


Source: patient, RN notes reviewed, old records reviewed


Mode of arrival: ambulatory


Limitations: no limitations





- History of Present Illness


Initial comments: 





32-year-old female presenting for mental health evaluation.  Patient states she 

has thoughts of harming herself with multiple plans including cutting herself.  

She states she has required inpatient psychiatric evaluation and treatment in 

the past.  She denies any ingestion.





- Related Data


                                Home Medications











 Medication  Instructions  Recorded  Confirmed


 


Aviane 0.1-0.02 Tablet 1 tab PO HS 10/26/20 05/11/22


 


Cyclobenzaprine [Flexeril] 10 mg PO DAILY PRN 22


 


Ergocalciferol [Vitamin D2 (1250 1,250 mcg PO WE 22





Mcg = 16572 Iu)]   


 


Lurasidone [Latuda] 40 mg PO DAILY 22


 


traZODone HCL [Desyrel] 200 mg PO HS 22








                                  Previous Rx's











 Medication  Instructions  Recorded


 


Ibuprofen [Motrin] 400 mg PO Q8HR PRN 7 Days  tab 22


 


Sertraline [Zoloft] 50 mg PO DAILY 30 Days  tab 22


 


lamoTRIgine [LaMICtal] 200 mg PO HS 30 Days  tab 22











                                    Allergies











Allergy/AdvReac Type Severity Reaction Status Date / Time


 


No Known Allergies Allergy   Verified 22 13:00














Review of Systems


ROS Statement: 


Those systems with pertinent positive or pertinent negative responses have been 

documented in the HPI.





ROS Other: All systems not noted in ROS Statement are negative.





Past Medical History


Past Medical History: No Reported History


Additional Past Medical History / Comment(s): OCD, borderline personality 

disorder.


History of Any Multi-Drug Resistant Organisms: None Reported


Past Surgical History:  Section


Additional Past Surgical History / Comment(s): I&D of cyst to buttocks


Past Anesthesia/Blood Transfusion Reactions: No Reported Reaction


Past Psychological History: Anxiety, Bipolar, PTSD


Smoking Status: Current every day smoker


Past Alcohol Use History: Occasional


Past Drug Use History: Marijuana





General Exam


Limitations: no limitations


General appearance: alert, in no apparent distress


Head exam: Present: atraumatic, normocephalic


Eye exam: Present: normal appearance, PERRL


ENT exam: Present: normal exam


Neck exam: Present: normal inspection.  Absent: tenderness, meningismus


Respiratory exam: Present: normal lung sounds bilaterally.  Absent: respiratory 

distress, wheezes


Cardiovascular Exam: Present: regular rate, normal rhythm


GI/Abdominal exam: Present: soft.  Absent: distended, tenderness, guarding


Extremities exam: Present: normal inspection, normal capillary refill.  Absent: 

pedal edema


Neurological exam: Present: alert, oriented X3, CN II-XII intact.  Absent: motor

 sensory deficit


Psychiatric exam: Present: depressed, anxious, suicidal ideation


Skin exam: Present: warm, dry, intact





Course


                                   Vital Signs











  23





  17:01


 


Temperature 98.1 F


 


Pulse Rate 118 H


 


Respiratory 20





Rate 


 


Blood Pressure 174/85


 


O2 Sat by Pulse 99





Oximetry 














- Reevaluation(s)


Reevaluation #1: 





23 17:19


Clear for EPS





Medical Decision Making





- Medical Decision Making





Was pt. sent in by a medical professional or institution (, PA, NP, urgent 

care, hospital, or nursing home...) When possible be specific


@  -No


Did you speak to anyone other than the patient for history (EMS, parent, family,

 police, friend...)? What history was obtained from this source 


@  -No


Did you review nursing and triage notes (agree or disagree)?  Why? 


@  -I reviewed and agree with nursing and triage notes


Were old charts reviewed (outside hosp., previous admission, EMS record, old 

EKG, old radiological studies, urgent care reports/EKG's, nursing home records)?

 Report findings 


@  -No old charts were reviewed


Differential Diagnosis (chest pain, altered mental status, abdominal pain women,

 abdominal pain men, vaginal bleeding, weakness, fever, dyspnea, syncope, 

headache, dizziness, GI bleed, back pain, seizure, CVA, palpatations, mental 

health, musculoskeletal)? 


@  -Differential Mental Health


Depression, anxiety, bipolar, psychosis, schizophrenia, borderline personality, 

situational depression, adjustment disorder, behavioral disorder, brain tumor, 

malingering, substance abuse, encephalopathy, medication reaction, dementia, 

hypothyroidism, degenerative neurologic disorder, lupus.... This is not meant to

 be all-inclusive list


EKG interpreted by me (3pts min.).


@  -As above


X-rays interpreted by me (1pt min.).


@  -None done


CT interpreted by me (1pt min.).


@  -None done


U/S interpreted by me (1pt. min.).


@  -None done


What testing was considered but not performed or refused? (CT, X-rays, U/S, lab

s)? Why?


@  -None


What meds were considered but not given or refused? Why?


@  -None


Did you discuss the management of the patient with other professionals 

(professionals i.e. DrGeorge, PA, NP, lab, RT, psych nurse, , , 

teacher, , )? Give summary


@  -EPS nurse, plan to admit


Was smoking cessation discussed for >3mins.?


@  -No


Was critical care preformed (if so, how long)?


@  -No


Were there social determinants of health that impacted care today? How? 

(Homelessness, low income, unemployed, alcoholism, drug addiction, 

transportation, low edu. Level, literacy, decrease access to med. care, long-term, 

rehab)?


@  -No


Was there de-escalation of care discussed even if they declined (Discuss DNR or 

withdrawal of care, Hospice)? DNR status


@  -No


What co-morbidities impacted this encounter? (DM, HTN, Smoking, COPD, CAD, 

Cancer, CVA, ARF, Chemo, Hep., AIDS, mental health diagnosis, sleep apnea, 

morbid obesity)?


@  -[Depression


Was patient admitted / discharged? Hospital course, mention meds given and 

route, prescriptions, significant lab abnormalities, going to OR and other 

pertinent info.


@  -32-year-old female presenting for mental health evaluation, suicidal 

ideation, patient medically cleared and evaluated by EPS and felt to require 

inpatient psychiatric evaluation treatment.  She will be admitted to this 

institution.


Undiagnosed new problem with uncertain prognosis?


@  -No


Drug Therapy requiring intensive monitoring for toxicity (Heparin, Nitro, 

Insulin, Cardizem)?


@  -No


Were any procedures done?


@  -No


Diagnosis/symptom?


@  Depression, suicidal ideation


Acute, or Chronic, or Acute on Chronic?


@  -Acute on chronic


Uncomplicated (without systemic symptoms) or Complicated (systemic symptoms)?


@  -default


Side effects of treatment?


@  -No


Exacerbation, Progression, or Severe Exacerbation?


@  -No


Poses a threat to life or bodily function? How? (Chest pain, USA, MI, pneumonia,

 PE, COPD, DKA, ARF, appy, cholecystitis, CVA, Diverticulitis, Homicidal, 

Suicidal, threat to staff... and all critical care pts)


@  -[Yes, self-harm





- Lab Data


                                   Lab Results











  23 Range/Units





  17:30 17:44 


 


Urine Opiates Screen  Not Detected   (NotDetected)  


 


Ur Oxycodone Screen  Not Detected   (NotDetected)  


 


Urine Methadone Screen  Not Detected   (NotDetected)  


 


Ur Propoxyphene Screen  Not Detected   (NotDetected)  


 


Ur Barbiturates Screen  Not Detected   (NotDetected)  


 


U Tricyclic Antidepress  Not Detected   (NotDetected)  


 


Ur Phencyclidine Scrn  Not Detected   (NotDetected)  


 


Ur Amphetamines Screen  Not Detected   (NotDetected)  


 


U Methamphetamines Scrn  Not Detected   (NotDetected)  


 


U Benzodiazepines Scrn  Detected H   (NotDetected)  


 


Urine Cocaine Screen  Not Detected   (NotDetected)  


 


U Marijuana (THC) Screen  Detected H   (NotDetected)  


 


Influenza Type A (PCR)   Not Detected  (Not Detectd)  


 


Influenza Type B (PCR)   Not Detected  (Not Detectd)  


 


RSV (PCR)   Not Detected  (Not Detectd)  


 


SARS-CoV-2 (PCR)   Not Detected  (Not Detectd)  














Disposition


Clinical Impression: 


 Suicidal ideation





Disposition: ADMITTED AS IP TO THIS [facility]


Condition: Stable


Is patient prescribed a controlled substance at d/c from ED?: No


Referrals: 


Jay Garcia MD [Primary Care Provider] - 1-2 days


Time of Disposition: 20:26

## 2023-06-23 LAB
ALBUMIN SERPL-MCNC: 4.4 G/DL (ref 3.5–5)
ALP SERPL-CCNC: 108 U/L (ref 38–126)
ALT SERPL-CCNC: 30 U/L (ref 4–34)
ANION GAP SERPL CALC-SCNC: 10 MMOL/L
AST SERPL-CCNC: 31 U/L (ref 14–36)
BASOPHILS # BLD AUTO: 0 K/UL (ref 0–0.2)
BASOPHILS NFR BLD AUTO: 0 %
BUN SERPL-SCNC: 12 MG/DL (ref 7–17)
CALCIUM SPEC-MCNC: 9.1 MG/DL (ref 8.4–10.2)
CHLORIDE SERPL-SCNC: 102 MMOL/L (ref 98–107)
CO2 SERPL-SCNC: 26 MMOL/L (ref 22–30)
EOSINOPHIL # BLD AUTO: 0.2 K/UL (ref 0–0.7)
EOSINOPHIL NFR BLD AUTO: 2 %
ERYTHROCYTE [DISTWIDTH] IN BLOOD BY AUTOMATED COUNT: 4.82 M/UL (ref 3.8–5.4)
ERYTHROCYTE [DISTWIDTH] IN BLOOD: 14.6 % (ref 11.5–15.5)
GLUCOSE SERPL-MCNC: 114 MG/DL (ref 74–99)
HCT VFR BLD AUTO: 44.2 % (ref 34–46)
HGB BLD-MCNC: 14.6 GM/DL (ref 11.4–16)
LYMPHOCYTES # SPEC AUTO: 2.1 K/UL (ref 1–4.8)
LYMPHOCYTES NFR SPEC AUTO: 24 %
MCH RBC QN AUTO: 30.3 PG (ref 25–35)
MCHC RBC AUTO-ENTMCNC: 33 G/DL (ref 31–37)
MCV RBC AUTO: 91.7 FL (ref 80–100)
MONOCYTES # BLD AUTO: 0.6 K/UL (ref 0–1)
MONOCYTES NFR BLD AUTO: 6 %
NEUTROPHILS # BLD AUTO: 6 K/UL (ref 1.3–7.7)
NEUTROPHILS NFR BLD AUTO: 67 %
PH UR: 6.5 [PH] (ref 5–8)
PLATELET # BLD AUTO: 334 K/UL (ref 150–450)
POTASSIUM SERPL-SCNC: 4.5 MMOL/L (ref 3.5–5.1)
PROT SERPL-MCNC: 7.9 G/DL (ref 6.3–8.2)
RBC UR QL: 5 /HPF (ref 0–5)
SODIUM SERPL-SCNC: 138 MMOL/L (ref 137–145)
SP GR UR: 1.03 (ref 1–1.03)
SQUAMOUS UR QL AUTO: 9 /HPF (ref 0–4)
UROBILINOGEN UR QL STRIP: <2 MG/DL (ref ?–2)
WBC # BLD AUTO: 9 K/UL (ref 3.8–10.6)
WBC #/AREA URNS HPF: 9 /HPF (ref 0–5)

## 2023-06-23 RX ADMIN — LIDOCAINE PRN PATCH: 50 PATCH TOPICAL at 21:09

## 2023-06-23 RX ADMIN — NICOTINE SCH PATCH: 14 PATCH, EXTENDED RELEASE TRANSDERMAL at 08:39

## 2023-06-23 RX ADMIN — IBUPROFEN PRN MG: 400 TABLET, FILM COATED ORAL at 08:39

## 2023-06-23 RX ADMIN — IBUPROFEN PRN MG: 400 TABLET, FILM COATED ORAL at 18:34

## 2023-06-23 RX ADMIN — BACLOFEN PRN MG: 10 TABLET ORAL at 02:16

## 2023-06-23 RX ADMIN — TOPIRAMATE SCH MG: 25 TABLET, FILM COATED ORAL at 20:38

## 2023-06-23 RX ADMIN — ASPIRIN SCH: 325 TABLET ORAL at 08:38
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
## 2023-06-23 NOTE — P.MDCNMH
History of Present Illness


H&P Date: 23


history of present illness; patient is a 32-year-old lady with past medical 

history significant for depression and suicidal ideation who presented to the ER

because of thoughts of hurting herself.  Patient stated that she has been 

depressed for the last few weeks.  Had thoughts of hurting herself by cutting 

herself.  Patient has been admitted to inpatient psych on multiple times on 

previous occasions.denies any homicidal thoughts.  Denies any auditory or visual

hallucinations


Initial lab work in the ER showed WBC 9, hemoglobin 14.6, sodium 138, potassium 

4.5, BUN 12, creatinine 0.7.


Urine drug screen positive for marijuana and benzos


Patient admitted to inpatient psych





REVIEW OF SYSTEMS: 


CONSTITUTIONAL: No fever, no malaise, no fatigue. 


HEENT: No recent visual problems or hearing problems. Denied any sore throat. 


CARDIOVASCULAR: No chest pain, orthopnea, PND, no palpitations, no syncope. 


PULMONARY: No shortness of breath, no cough, no hemoptysis. 


GASTROINTESTINAL: No diarrhea, no nausea, no vomiting, no abdominal pain. 


NEUROLOGICAL: No headaches, no weakness, no numbness. 


HEMATOLOGICAL: Denies any bleeding or petechiae. 


GENITOURINARY: Denies any burning micturition, frequency, or urgency. 


MUSCULOSKELETAL/RHEUMATOLOGICAL: Complaining of back pain


ENDOCRINE: Denies any polyuria or polydipsia. 





The rest of the 14-point review of systems is negative.











PHYSICAL EXAMINATION: 





GENERAL: The patient is alert and oriented x3, not in any acute distress. Well 

developed, well nourished. 


HEENT: Pupils are round and equally reacting to light. EOMI. No scleral icterus.

No conjunctival pallor. Normocephalic, atraumatic. No pharyngeal erythema. No 

thyromegaly. 


CARDIOVASCULAR: S1 and S2 present. No murmurs, rubs, or gallops. 


PULMONARY: Chest is clear to auscultation, no wheezing or crackles. 


ABDOMEN: Soft, nontender, nondistended, normoactive bowel sounds. No palpable 

organomegaly. 


MUSCULOSKELETAL: No joint swelling or deformity.


EXTREMITIES: No cyanosis, clubbing, or pedal edema. 


NEUROLOGICAL: Gross neurological examination did not reveal any focal deficits. 


SKIN: No rashes. 





Assessment and plan


major depression


Suicidal thoughts.


PTSD


OCD


Anxiety





monitor vital signs


Suicide precautions


Elopement precautions


Ordered lidocaine patch for her back pain


Continue psych meds per psychiatry team





DVT prophylaxis:








Past Medical History


Past Medical History: Fibromyalgia


Additional Past Medical History / Comment(s): OCD, borderline personality 

disorder. states fibromyalgia but not diagnosed


History of Any Multi-Drug Resistant Organisms: None Reported


Past Surgical History:  Section


Additional Past Surgical History / Comment(s): I&D of cyst to buttocks


Past Anesthesia/Blood Transfusion Reactions: No Reported Reaction


Past Psychological History: Anxiety, Bipolar, PTSD


Smoking Status: Current every day smoker


Past Alcohol Use History: Occasional


Past Drug Use History: Marijuana





Medications and Allergies


                                Home Medications











 Medication  Instructions  Recorded  Confirmed  Type


 


Cyclobenzaprine [Flexeril] 10 mg PO DAILY PRN 22 History


 


Aviane 1 tab PO DAILY 23 History


 


Baclofen [Lioresal] 20 mg PO TID PRN 23 History


 


Omeprazole [PriLOSEC] 20 mg PO DAILY 23 History


 


Sertraline [Zoloft] 150 mg PO DAILY 23 History


 


haloperidoL [Haldol] 5 mg PO DAILY PRN 23 History


 


haloperidoL [Haloperidol] 10 mg PO DAILY 23 History


 


lamoTRIgine [LaMICtal] 200 mg PO HS 23 History








                                    Allergies











Allergy/AdvReac Type Severity Reaction Status Date / Time


 


No Known Allergies Allergy   Verified 23 20:39














Physical Exam


Vitals: 


                                   Vital Signs











  Temp Pulse Pulse Resp BP BP Pulse Ox


 


 23 02:18  98.7 F   116 H  18   124/67  99


 


 23 23:57  97.8 F   112 H  16   124/59 


 


 23 21:47   88   20  127/73   100


 


 23 17:01  98.1 F  118 H   20  174/85   99








                                Intake and Output











 23





 22:59 06:59 14:59


 


Other:   


 


  Weight 127.006 kg 127.006 kg 














Cranial Nerve Examination





- Cranial Nerves


Cranial Nerve II- Optic: Intact (Cranial nerves II through XII intact)


Cranial Nerve III- Oculomotor: Intact


Cranial Nerve IV- Trochlear: Intact


Cranial Nerve V- Trigeminal: Intact


Cranial Nerve VI- Abducens: Intact


Cranial Nerve VII- Facial: Intact


Cranial Nerve VIII- Auditory: Intact


Cranial Nerve IX- Glossopharyngeal: Intact


Cranial Nerve X- Vagus: Intact


Cranial Nerve XI- Accessory: Intact


Cranial Nerve XII- Hypoglossal: Intact





Results


CBC & Chem 7: 


                                 23 07:15





                                 23 07:15


Labs: 


                  Abnormal Lab Results - Last 24 Hours (Table)











  23 Range/Units





  17:30 17:30 07:15 


 


Glucose    114 H  (74-99)  mg/dL


 


Urine Appearance   Cloudy H   (Clear)  


 


Urine Protein   Trace H   (Negative)  


 


Urine Blood   Moderate H   (Negative)  


 


Ur Leukocyte Esterase   Small H   (Negative)  


 


Urine WBC   9 H   (0-5)  /hpf


 


Ur Squamous Epith Cells   9 H   (0-4)  /hpf


 


Calcium Oxalate Crystal   Moderate H   (None)  /hpf


 


Urine Bacteria   Rare H   (None)  /hpf


 


Urine Mucus   Rare H   (None)  /hpf


 


U Benzodiazepines Scrn  Detected H    (NotDetected)  


 


U Marijuana (THC) Screen  Detected H    (NotDetected)

## 2023-06-24 RX ADMIN — TOPIRAMATE SCH MG: 25 TABLET, FILM COATED ORAL at 20:53

## 2023-06-24 RX ADMIN — BACLOFEN PRN MG: 10 TABLET ORAL at 22:00

## 2023-06-24 RX ADMIN — ASPIRIN SCH: 325 TABLET ORAL at 08:11

## 2023-06-24 RX ADMIN — TOPIRAMATE SCH MG: 25 TABLET, FILM COATED ORAL at 08:11

## 2023-06-24 RX ADMIN — IBUPROFEN PRN MG: 400 TABLET, FILM COATED ORAL at 15:10

## 2023-06-24 RX ADMIN — IBUPROFEN PRN MG: 400 TABLET, FILM COATED ORAL at 01:01

## 2023-06-24 RX ADMIN — BACLOFEN PRN MG: 10 TABLET ORAL at 01:01

## 2023-06-24 RX ADMIN — NICOTINE SCH PATCH: 14 PATCH, EXTENDED RELEASE TRANSDERMAL at 08:11

## 2023-06-24 NOTE — P.PN
Progress Note - Text


Progress Note Date: 06/24/23





Interval History:


Patient was seen sitting in the lounge today watching television and was direc

table and agreeable to speak with writer in the office.  Patient spoke briefly 

about why she came in the hospital mainly complained about her "OCD symptoms".  

She states that today she is doing better in terms of her anxiety and mood.  She

spoke about wanting to taper down and reduce the dose of the haloperidol.  We 

agreed to decrease down to 7 mg for tonight and observe.  States that she slept 

fairly well last night.  She claims that she has been trying to go to groups.  

She was focused on discharge possibly Monday.  Claims that her mood is gradually

improving.  At this time patient denies any suicidal or homical ideations, 

intent or plan. Patient denies any current auditory, visual hallucinations and 

denies any paranoia or delusions. Patient denies any side effects from the 

medications and has been compliant with meds. 





Mental Status Exam:


General Appearance: Patient appears to be stated age is alert, directable, and 

attempts to cooperate. Patient appears to have slightly disheveled hygiene and 

grooming.  Patient has numerous piercings and numerous tattoos.  Obese body 

habitus.


Behavior: Patient is seated without any agitated behavior.  Eye contact is 

appropriate.


Speech: Patient's speech is fluent and nonpressured.  Monotone.  Spontaneous.


Mood/Affect: Patient reports their mood is improving mildly, affect is congruent

and constricted. 


Suicidality/Homicidality:  Patient is currently denying any suicidal or 

homicidal ideation.


Perceptions: Patient denies any visual hallucinations and denies any auditory 

hallucinations


Though content/process: There is no evidence of any delusional thought content 

and thought process is linear and goal-directed.  [place]


Memory and concentration: AOX3, grossly intact for the purposes of this session


Judgment and insight: Improving mildly





IMPRESSIONS: 


Major depressive disorder


Obsessive-compulsive disorder


Borderline personality disorder


Posttraumatic stress disorder





PLAN: 


-Patient is admitted under voluntary status to MHU for stabilization of 

psychiatric symptoms and safety. Patient signed adult voluntary form and 

medication consent and is placed in patient's chart. 


-Medications : 


Luvox 50 mg by mouth at bedtime for depression/OCD/PTSD


decrease Haldol 7 mg by mouth at bedtime for paranoia, possibly reduce further 

if tolerated tomorrow.


Topamax 25 mg by mouth twice a day for off label mood and weight management


Lamictal 200 mg by mouth at bedtime for mood stabilization


-Haldol and Vistaril PRN for agitation/aggression


-NRT - nicotine patch


-SW on board for discharge planning. Encourage patient to participate in groups 

to work on coping skills.

## 2023-06-25 VITALS — RESPIRATION RATE: 16 BRPM

## 2023-06-25 RX ADMIN — IBUPROFEN PRN MG: 400 TABLET, FILM COATED ORAL at 20:48

## 2023-06-25 RX ADMIN — TOPIRAMATE SCH MG: 25 TABLET, FILM COATED ORAL at 09:25

## 2023-06-25 RX ADMIN — BACLOFEN PRN MG: 10 TABLET ORAL at 06:29

## 2023-06-25 RX ADMIN — LIDOCAINE PRN PATCH: 50 PATCH TOPICAL at 20:42

## 2023-06-25 RX ADMIN — BACLOFEN PRN MG: 10 TABLET ORAL at 20:41

## 2023-06-25 RX ADMIN — NICOTINE SCH PATCH: 14 PATCH, EXTENDED RELEASE TRANSDERMAL at 09:25

## 2023-06-25 RX ADMIN — ASPIRIN SCH: 325 TABLET ORAL at 09:23

## 2023-06-25 RX ADMIN — TOPIRAMATE SCH MG: 25 TABLET, FILM COATED ORAL at 20:39

## 2023-06-25 NOTE — P.PN
Progress Note - Text


Progress Note Date: 06/25/23





Interval History:


Patient was seen sitting in with group today and was agreeable to direct her in 

the office.  She claims that her anxiety and mood has been improving however 

states that last night she began hearing voices again.  Claims that she did not 

sleep well last night.  We spoke about needing to increase her haloperidol again

which she was okay with.  She continues to have a constricted affect, fairly 

concrete.She states that today she is doing better in terms of her anxiety and 

mood.   She claims that she has been trying to go to groups.  She was focused on

discharge possibly Monday.  Claims that her mood is gradually improving.  At 

this time patient denies any suicidal or homical ideations, intent or plan. 

Patient denies any current auditory, visual hallucinations and denies any 

paranoia or delusions however did hear minor auditory hallucinations last night.

Patient denies any side effects from the medications and has been compliant with

meds. 





Mental Status Exam:


General Appearance: Patient appears to be stated age is alert, directable, and 

attempts to cooperate. Patient appears to have proven hygiene and grooming.  

Patient has numerous piercings and numerous tattoos.  Obese body habitus.


Behavior: Patient is seated without any agitated behavior.  Eye contact is a

ppropriate.


Speech: Patient's speech is fluent and nonpressured.  Monotone.  Spontaneous.


Mood/Affect: Patient reports their mood is improving mildly, affect is congruent




Suicidality/Homicidality:  Patient is currently denying any suicidal or 

homicidal ideation.


Perceptions: Patient denies any visual hallucinations and denies any auditory 

hallucinations


Though content/process: There is no evidence of any delusional thought content 

and thought process is linear and goal-directed.  [place]


Memory and concentration: AOX3, grossly intact for the purposes of this session


Judgment and insight: Improving mildly





IMPRESSIONS: 


Major depressive disorder


Obsessive-compulsive disorder


Borderline personality disorder


Posttraumatic stress disorder





PLAN: 


-Patient is admitted under voluntary status to MHU for stabilization of 

psychiatric symptoms and safety. Patient signed adult voluntary form and 

medication consent and is placed in patient's chart. 


-Medications : 


Luvox 50 mg by mouth at bedtime for depression/OCD/PTSD


continue Haldol 10 mg by mouth at bedtime for paranoia/hallucinations


Topamax 25 mg by mouth twice a day for off label mood and weight management


Lamictal 200 mg by mouth at bedtime for mood stabilization


-Haldol and Vistaril PRN for agitation/aggression


-NRT - nicotine patch


-SW on board for discharge planning. Encourage patient to participate in groups 

to work on coping skills.

## 2023-06-26 VITALS — SYSTOLIC BLOOD PRESSURE: 122 MMHG | TEMPERATURE: 98.1 F | HEART RATE: 111 BPM | DIASTOLIC BLOOD PRESSURE: 68 MMHG

## 2023-06-26 RX ADMIN — TOPIRAMATE SCH MG: 25 TABLET, FILM COATED ORAL at 08:11

## 2023-06-26 RX ADMIN — ASPIRIN SCH: 325 TABLET ORAL at 08:13

## 2023-06-26 RX ADMIN — NICOTINE SCH PATCH: 14 PATCH, EXTENDED RELEASE TRANSDERMAL at 08:11

## 2023-06-26 NOTE — P.DS
Providers


Date of admission: 


06/22/23 21:10





Expected date of discharge: 06/26/23


Attending physician: 


Cj Rehman MD





Consults: 





                                        





06/23/23 07:50


Consult Physician Routine 


   Consulting Provider: [facility]


   Consult Reason/Comments: history and physical


   Do you want consulting provider notified?: Yes











Primary care physician: 


Jay Garcia








- Discharge Diagnosis(es)


(1) Major depressive disorder, recurrent severe without psychotic features


Current Visit: Yes   Status: Acute   Priority: High   





(2) OCD (obsessive compulsive disorder)


Current Visit: Yes   Status: Acute   Priority: High   





(3) Borderline personality disorder


Current Visit: Yes   Status: Chronic   Priority: Medium   





(4) PTSD (post-traumatic stress disorder)


Current Visit: Yes   Status: Chronic   Priority: Medium   





(5) Cannabis use disorder, mild, abuse


Current Visit: Yes   Status: Chronic   Priority: Low   


Hospital Course: 





Admission HPI:


 Patient is a , employed, 32-year-old  female with a significant

history of OCD, BPD, and PTSD who presents to her hospital on 06/22/2023 for 

suicidal ideation.





Patient presented to the hospital on 06/22/2023 for suicidal ideation with plan 

to cut her wrists with a .  The patient signed herself voluntarily on 

to the psychiatric unit.  The patient reports that she has been feeling 

increasingly depressed over the past few weeks.  She reports that she has been 

expressing numerous stressors including being bullied by her ex-, her and

her family being unable to move to a new home, and financial stressors.  She 

reports that her mood has been deteriorating over the past 2-3 weeks.  She 

endorses significant symptoms of depression including decreased motivation, 

decreased energy, low mood, crying episodes, and urges for self-harm.  The 

patient does endorse significant symptoms of OCD including intrusive and 

gruesome "visions of my own death."  The patient reports that she has previously

attempted suicide by overdose.  She reports that she did engage in self 

mutilating behavior by cutting her breast earlier this year.  She reports vague 

auditory hallucinations of voices as if they are in another room.  She reports 

no other hallucinations.  She reports no paranoia or other delusions.  She is 

not endorsing any significant symptoms of milena or hypomania.  She denies any 

racing thoughts, mood lability, grandiosity, periods of excessive energy, or 

increased goal-directed activity.


The patient does endorse significant symptoms of cluster B personality disorder 

including fear of abandonment, trust issues, self-injurious behavior, 

intermittent episodes of extreme dysphoria, and chronic feelings of emptiness.


The patient is agreeable for voluntary admission and for medication adjustments.





 Patient states that she has been previously diagnosed with OCD, BPD, PTSD, and 

bipolar 2 disorder.  Patient is currently open with [facility] and sees Dr. Lundberg.  She reports that this is her fifth inpatient psychiatric admission.  

She reports that her last psychiatric admission was this past winter at 

[facility].  The patient has had previous trials of Celexa, Lamictal, trazodone, 

Zoloft, and latuda.  She does report prior suicide attempts.





Hospital course:


Upon admission to the unit patient was initially presenting as depressed, 

suicidal, and disheveled. Patient was however directable and agreeable to 

commence treatment. Patient got along well with other patients on the unit and 

followed unit protocol.  Patient was compliant with the medications and denied 

any side effects throughout hospital course.  Patient was started on a regimen 

of Luvox and Topamax to address OCD/PTSD as well as for age and weight 

management.  She was continued on her regimen of Haldol and Lamictal.  Patient 

spoke of her stressors and engaged in therapy both group and individual.  

Patient was also seen by medical team for history and physical exam.  Over the 

course the hospitalization, the patient displayed significant improvement in 

regards to her target symptoms of mood lability, suicidal thoughts, and 

frustration tolerance.  She became more future and goal oriented and develop 

better insight and judgment.  She is able to exercise appropriate coping skills.

 We discussed at length dialectical behavioral therapy techniques.  On the day 

of discharge, the patient is not reporting any suicidal or homicidal ideation, 

intention, and/or plan.  She is not reporting any auditory or visual 

hallucinations.  She reports no paranoia or other delusions.  The patient 

reported no access to firearms or other weapons.  She reports wanting to live 

for her health and for her family.  The patient does have a significant history 

of substance use and was counseled at great length abstaining from all substance

and alcohol, tobacco, marijuana, and all illicit drugs.  The patient reports no 

medical issues or concerns on the day of discharge and is denying any chest 

pain, shortness of breath, palpitations, tardive dyskinesia, or akathisia.  She 

was encouraged to follow-up with her outpatient appointments for mental health 

and for primary care.  Prior to discharge, family meeting will be arranged by 

the  to answer any questions and ensure safety.





Mental status exam:


General Appearance: Patient appears to be stated age is alert, pleasant, and 

cooperative. Patient is in no acute distress and has fair hygiene and grooming. 

Obese body habitus.  Numerous tattoos numerous piercings. 


Behavior: Patient is calmly seated without any agitated behavior.


Speech: Patient's speech is fluent and nonpressured. 


Mood/Affect: Patient reports their mood is "much better", affect is congruent 

and euthymic to bright. 


Suicidality/Homicidality:  Patient denies having any suicidal or homicidal 

ideation intent or plan.  


Perceptions: Patient denies any auditory or visual hallucinations.  


Though content/process: There is no evidence of any delusional thought content 

and thought process is linear and goal-directed.  She is future and goal 

oriented.


Memory and concentration: AOX3, grossly intact for the purposes of this session.

Can spell "WORLD" backwards correctly.


Judgment and insight: Improved with guarded prognosis





Impression:


Major depressive disorder


Obsessive-compulsive disorder


Borderline personality disorder


Posttraumatic stress disorder


Cannabis use disorder





Plan:


-Continue with discharge today as patient has improved and stabilized 

psychiatrically and is not currently an imminent threat to herself and/or 

others. Patient will remain at chronically elevated risk for harm to self and/or

others due to her impulsivity and poor frustration tolerance.


-Continue medications: 


Haldol 10 mg by mouth at bedtime for mood stabilization with 5 mg by mouth daily

when necessary for agitation


Luvox 50 mg by mouth at bedtime for OCD/PTSD/depression/anxiety


Topamax 25 mg by mouth twice a day for off label use for PTSD


Lamictal 200 mg by mouth at bedtime for mood stabilization


-Patient was counseled on the need for medication compliance and appropriate 

follow-up at mental health and also primary care for medical issues.  Patient 

verbalized understanding and agreed.


-Social work to arrange for and conduct family meeting to ensure safety upon 

discharge and answer any questions/concerns. Social work also to arrange for 

patients follow up appointments with [facility] for psychiatric care along with follow 

up with primary care provider.


-Patient counseled on abstaining from recreational drugs and marijuana and 

alcohol. Was informed/educated on the adverse effects on their physical and 

mental health. Patient verbally agreed and understood. 


-Patient was instructed to return to the hospital or seek immediate medical care

if their psychiatric or medical symptoms do worsen or reoccur.


-Psychoeducation and supportive therapy provided to patient.  Risks and benefits

of pharmacological treatment versus the risks and benefits of nontreatment 

weighed and discussed.  Informed consent discussion held.  Common side effects 

of psychotropics discussed such as, but not limited to headache, GI disturbance,

sexual dysfunction, movement disorders, sedation, and orthostatic hypotension.  

Life threatening and blackbox warnings of prescribed medications also discussed.

 Potential risks of operating a vehicle or heavy machinery discussed with 

patient at length.  Advised on importance of compliance and a reliable and 

responsible manner. Patient advised to review FDA consumer labeling of all 

medications prior to taking.  Patient verbalized understanding of potential 

risks, and agrees with current treatment plan.  Patient advised to medically 

contact physician/emergency personnel if any acute changes in condition occur.











                                   Vital Signs











Temp  98.1 F   06/26/23 08:13


 


Pulse  111 H  06/26/23 08:13


 


Resp  16   06/26/23 08:13


 


BP  122/68   06/26/23 08:13


 


Pulse Ox  97   06/25/23 06:32


 


FiO2      








                                 Intake & Output











 06/25/23 06/26/23 06/26/23





 18:59 06:59 18:59


 


Weight 127.3 kg  











                               Laboratory Results











WBC  9.0 k/uL (3.8-10.6)   06/23/23  07:15    


 


RBC  4.82 m/uL (3.80-5.40)   06/23/23  07:15    


 


Hgb  14.6 gm/dL (11.4-16.0)   06/23/23  07:15    


 


Hct  44.2 % (34.0-46.0)   06/23/23  07:15    


 


MCV  91.7 fL (80.0-100.0)   06/23/23  07:15    


 


MCH  30.3 pg (25.0-35.0)   06/23/23  07:15    


 


MCHC  33.0 g/dL (31.0-37.0)   06/23/23  07:15    


 


RDW  14.6 % (11.5-15.5)   06/23/23  07:15    


 


Plt Count  334 k/uL (150-450)   06/23/23  07:15    


 


MPV  6.9   06/23/23  07:15    


 


Neutrophils %  67 %  06/23/23  07:15    


 


Lymphocytes %  24 %  06/23/23  07:15    


 


Monocytes %  6 %  06/23/23  07:15    


 


Eosinophils %  2 %  06/23/23  07:15    


 


Basophils %  0 %  06/23/23  07:15    


 


Neutrophils #  6.0 k/uL (1.3-7.7)   06/23/23  07:15    


 


Lymphocytes #  2.1 k/uL (1.0-4.8)   06/23/23  07:15    


 


Monocytes #  0.6 k/uL (0-1.0)   06/23/23  07:15    


 


Eosinophils #  0.2 k/uL (0-0.7)   06/23/23  07:15    


 


Basophils #  0.0 k/uL (0-0.2)   06/23/23  07:15    


 


Sodium  138 mmol/L (137-145)   06/23/23  07:15    


 


Potassium  4.5 mmol/L (3.5-5.1)   06/23/23  07:15    


 


Chloride  102 mmol/L ()   06/23/23  07:15    


 


Carbon Dioxide  26 mmol/L (22-30)   06/23/23  07:15    


 


Anion Gap  10 mmol/L  06/23/23  07:15    


 


BUN  12 mg/dL (7-17)   06/23/23  07:15    


 


Creatinine  0.70 mg/dL (0.52-1.04)   06/23/23  07:15    


 


Est GFR (CKD-EPI)AfAm  >90  (>60 ml/min/1.73 sqM)   06/23/23  07:15    


 


Est GFR (CKD-EPI)NonAf  >90  (>60 ml/min/1.73 sqM)   06/23/23  07:15    


 


Glucose  114 mg/dL (74-99)  H  06/23/23  07:15    


 


Estimated Ave Glu mg/dL  103 mg/dL  06/23/23  07:15    


 


Hemoglobin A1c  5.2 % (<=6.0)   06/23/23  07:15    


 


Calcium  9.1 mg/dL (8.4-10.2)   06/23/23  07:15    


 


Total Bilirubin  0.5 mg/dL (0.2-1.3)   06/23/23  07:15    


 


AST  31 U/L (14-36)   06/23/23  07:15    


 


ALT  30 U/L (4-34)   06/23/23  07:15    


 


Alkaline Phosphatase  108 U/L ()   06/23/23  07:15    


 


Total Protein  7.9 g/dL (6.3-8.2)   06/23/23  07:15    


 


Albumin  4.4 g/dL (3.5-5.0)   06/23/23  07:15    


 


TSH  1.470 mIU/L (0.465-4.680)   06/23/23  07:15    


 


Urine Color  Yellow   06/22/23  17:30    


 


Urine Appearance  Cloudy  (Clear)  H  06/22/23  17:30    


 


Urine pH  6.5  (5.0-8.0)   06/22/23  17:30    


 


Ur Specific Gravity  1.029  (1.001-1.035)   06/22/23  17:30    


 


Urine Protein  Trace  (Negative)  H  06/22/23  17:30    


 


Urine Glucose (UA)  Negative  (Negative)   06/22/23  17:30    


 


Urine Ketones  Negative  (Negative)   06/22/23  17:30    


 


Urine Blood  Moderate  (Negative)  H  06/22/23  17:30    


 


Urine Nitrite  Negative  (Negative)   06/22/23  17:30    


 


Urine Bilirubin  Negative  (Negative)   06/22/23  17:30    


 


Urine Urobilinogen  <2.0 mg/dL (<2.0)   06/22/23  17:30    


 


Ur Leukocyte Esterase  Small  (Negative)  H  06/22/23  17:30    


 


Urine RBC  5 /hpf (0-5)   06/22/23  17:30    


 


Urine WBC  9 /hpf (0-5)  H  06/22/23  17:30    


 


Ur Squamous Epith Cells  9 /hpf (0-4)  H  06/22/23  17:30    


 


Calcium Oxalate Crystal  Moderate /hpf (None)  H  06/22/23  17:30    


 


Urine Bacteria  Rare /hpf (None)  H  06/22/23  17:30    


 


Urine Mucus  Rare /hpf (None)  H  06/22/23  17:30    


 


Urine HCG, Qual  Not Detected  (Not Detectd)   06/22/23  17:30    


 


Urine Opiates Screen  Not Detected  (NotDetected)   06/22/23  17:30    


 


Ur Oxycodone Screen  Not Detected  (NotDetected)   06/22/23  17:30    


 


Urine Methadone Screen  Not Detected  (NotDetected)   06/22/23  17:30    


 


Ur Propoxyphene Screen  Not Detected  (NotDetected)   06/22/23  17:30    


 


Ur Barbiturates Screen  Not Detected  (NotDetected)   06/22/23  17:30    


 


Lamotrigine  3.4 ug/mL (2.0-15.0)   06/23/23  07:15    


 


U Tricyclic Antidepress  Not Detected  (NotDetected)   06/22/23  17:30    


 


Ur Phencyclidine Scrn  Not Detected  (NotDetected)   06/22/23  17:30    


 


Ur Amphetamines Screen  Not Detected  (NotDetected)   06/22/23  17:30    


 


U Methamphetamines Scrn  Not Detected  (NotDetected)   06/22/23  17:30    


 


U Benzodiazepines Scrn  Detected  (NotDetected)  H  06/22/23  17:30    


 


Urine Cocaine Screen  Not Detected  (NotDetected)   06/22/23  17:30    


 


U Marijuana (THC) Screen  Detected  (NotDetected)  H  06/22/23  17:30    


 


Influenza Type A (PCR)  Not Detected  (Not Detectd)   06/22/23  17:44    


 


Influenza Type B (PCR)  Not Detected  (Not Detectd)   06/22/23  17:44    


 


RSV (PCR)  Not Detected  (Not Detectd)   06/22/23  17:44    


 


SARS-CoV-2 (PCR)  Not Detected  (Not Detectd)   06/22/23  17:44    











                                    Allergies











Allergy/AdvReac Type Severity Reaction Status Date / Time


 


No Known Allergies Allergy   Verified 06/22/23 20:39














Patient Condition at Discharge: Stable





Plan - Discharge Summary


Discharge Rx Participant: Yes


New Discharge Prescriptions: 


New


   haloperidoL [Haldol] 5 mg PO DAILY PRN 30 Days #30 tab


     PRN Reason: psychosis or agitation


   haloperidoL [Haldol] 10 mg PO HS 30 Days #60 tab


   Topiramate [Topamax] 25 mg PO BID 30 Days #60 tab


   fluvoxaMINE [Luvox] 50 mg PO HS 30 Days #30 tab





Continue


   Aviane 1 tab PO DAILY


   Cyclobenzaprine [Flexeril] 10 mg PO DAILY PRN


     PRN Reason: Muscle Spasm


   Omeprazole [PriLOSEC] 20 mg PO DAILY


   Baclofen [Lioresal] 20 mg PO TID PRN


     PRN Reason: Muscle Spasm


   lamoTRIgine [LaMICtal] 200 mg PO HS 30 Days #30 tab





Discontinued


   haloperidoL [Haloperidol] 10 mg PO DAILY


   Sertraline [Zoloft] 150 mg PO DAILY


   haloperidoL [Haldol] 5 mg PO DAILY PRN


     PRN Reason: psychosis or agitation


Discharge Medication List





Cyclobenzaprine [Flexeril] 10 mg PO DAILY PRN 01/21/22 [History]


Aviane 1 tab PO DAILY 06/22/23 [History]


Baclofen [Lioresal] 20 mg PO TID PRN 06/22/23 [History]


Omeprazole [PriLOSEC] 20 mg PO DAILY 06/22/23 [History]


Topiramate [Topamax] 25 mg PO BID 30 Days #60 tab 06/26/23 [Rx]


fluvoxaMINE [Luvox] 50 mg PO HS 30 Days #30 tab 06/26/23 [Rx]


haloperidoL [Haldol] 5 mg PO DAILY PRN 30 Days #30 tab 06/26/23 [Rx]


haloperidoL [Haldol] 10 mg PO HS 30 Days #60 tab 06/26/23 [Rx]


lamoTRIgine [LaMICtal] 200 mg PO HS 30 Days #30 tab 06/26/23 [Rx]








Follow up Appointment(s)/Referral(s): 


[facility] [Outside] - 06/29/23 3:00 pm


(6-29-23 3pm with Dina - in [place] office





7-19-23 9am with Dr. Lundberg - in [place] office)


Jay Garcia MD [Primary Care Provider] - 1-2 days


Patient Instructions/Handouts:  Depression (DC), Generalized Anxiety Disorder 

(ED)


Activity/Diet/Wound Care/Special Instructions: 


Avoid the use of street drugs and alcohol.  Take all medications as prescribed. 

When you are in need of refills on your medications, please contact your medical

provider and/or outpatient psychiatrist to have this done.  Please go to 

scheduled outpatient appointments for aftercare treatment.  If symptoms return 

or become worse, call the crisis line at [phone] and/or go to the nearest

emergency room for evaluation.


Discharge Disposition: HOME SELF-CARE